# Patient Record
Sex: MALE | Race: WHITE | NOT HISPANIC OR LATINO | Employment: UNEMPLOYED | URBAN - METROPOLITAN AREA
[De-identification: names, ages, dates, MRNs, and addresses within clinical notes are randomized per-mention and may not be internally consistent; named-entity substitution may affect disease eponyms.]

---

## 2019-01-01 ENCOUNTER — HOSPITAL ENCOUNTER (INPATIENT)
Facility: HOSPITAL | Age: 64
LOS: 4 days | End: 2019-06-21
Attending: INTERNAL MEDICINE | Admitting: INTERNAL MEDICINE

## 2019-01-01 ENCOUNTER — APPOINTMENT (OUTPATIENT)
Dept: CARDIOLOGY | Facility: HOSPITAL | Age: 64
End: 2019-01-01

## 2019-01-01 ENCOUNTER — ANESTHESIA EVENT (OUTPATIENT)
Dept: PERIOP | Facility: HOSPITAL | Age: 64
End: 2019-01-01

## 2019-01-01 ENCOUNTER — APPOINTMENT (OUTPATIENT)
Dept: GENERAL RADIOLOGY | Facility: HOSPITAL | Age: 64
End: 2019-01-01

## 2019-01-01 ENCOUNTER — ANESTHESIA (OUTPATIENT)
Dept: PERIOP | Facility: HOSPITAL | Age: 64
End: 2019-01-01

## 2019-01-01 VITALS
OXYGEN SATURATION: 95 % | RESPIRATION RATE: 18 BRPM | TEMPERATURE: 98.1 F | WEIGHT: 174.3 LBS | BODY MASS INDEX: 23.61 KG/M2 | DIASTOLIC BLOOD PRESSURE: 33 MMHG | SYSTOLIC BLOOD PRESSURE: 60 MMHG | HEIGHT: 72 IN

## 2019-01-01 DIAGNOSIS — K56.609 LARGE BOWEL OBSTRUCTION (HCC): Primary | ICD-10-CM

## 2019-01-01 LAB
ALBUMIN SERPL-MCNC: 2.6 G/DL (ref 3.5–5.2)
ALBUMIN SERPL-MCNC: 2.9 G/DL (ref 3.5–5.2)
ALBUMIN SERPL-MCNC: 3 G/DL (ref 3.5–5.2)
ALBUMIN SERPL-MCNC: 3 G/DL (ref 3.5–5.2)
ALBUMIN SERPL-MCNC: 3.3 G/DL (ref 3.5–5.2)
ALBUMIN/GLOB SERPL: 0.8 G/DL
ALBUMIN/GLOB SERPL: 1 G/DL
ALBUMIN/GLOB SERPL: 1.1 G/DL
ALP SERPL-CCNC: 55 U/L (ref 39–117)
ALP SERPL-CCNC: 57 U/L (ref 39–117)
ALP SERPL-CCNC: 58 U/L (ref 39–117)
ALP SERPL-CCNC: 59 U/L (ref 39–117)
ALP SERPL-CCNC: 77 U/L (ref 39–117)
ALT SERPL W P-5'-P-CCNC: 2377 U/L (ref 1–41)
ALT SERPL W P-5'-P-CCNC: 35 U/L (ref 1–41)
ALT SERPL W P-5'-P-CCNC: 38 U/L (ref 1–41)
ALT SERPL W P-5'-P-CCNC: 39 U/L (ref 1–41)
ALT SERPL W P-5'-P-CCNC: 39 U/L (ref 1–41)
ANION GAP SERPL CALCULATED.3IONS-SCNC: 12.2 MMOL/L
ANION GAP SERPL CALCULATED.3IONS-SCNC: 12.5 MMOL/L
ANION GAP SERPL CALCULATED.3IONS-SCNC: 12.8 MMOL/L
ANION GAP SERPL CALCULATED.3IONS-SCNC: 13.3 MMOL/L
ANION GAP SERPL CALCULATED.3IONS-SCNC: 13.4 MMOL/L
ANION GAP SERPL CALCULATED.3IONS-SCNC: 13.5 MMOL/L
ANION GAP SERPL CALCULATED.3IONS-SCNC: 13.8 MMOL/L
ANION GAP SERPL CALCULATED.3IONS-SCNC: 14.3 MMOL/L
ANION GAP SERPL CALCULATED.3IONS-SCNC: 14.5 MMOL/L
ANION GAP SERPL CALCULATED.3IONS-SCNC: 14.5 MMOL/L
ANION GAP SERPL CALCULATED.3IONS-SCNC: 14.6 MMOL/L
ANION GAP SERPL CALCULATED.3IONS-SCNC: 15.1 MMOL/L
ANION GAP SERPL CALCULATED.3IONS-SCNC: 15.9 MMOL/L
ANION GAP SERPL CALCULATED.3IONS-SCNC: 16.6 MMOL/L
ANION GAP SERPL CALCULATED.3IONS-SCNC: 17 MMOL/L
ANION GAP SERPL CALCULATED.3IONS-SCNC: 19 MMOL/L
ANION GAP SERPL CALCULATED.3IONS-SCNC: 24.5 MMOL/L
AORTIC DIMENSIONLESS INDEX: 1.1 (DI)
APTT PPP: 49.5 SECONDS (ref 22.7–35.4)
ARTERIAL PATENCY WRIST A: ABNORMAL
AST SERPL-CCNC: 21 U/L (ref 1–40)
AST SERPL-CCNC: 24 U/L (ref 1–40)
AST SERPL-CCNC: 3642 U/L (ref 1–40)
AST SERPL-CCNC: 38 U/L (ref 1–40)
AST SERPL-CCNC: 44 U/L (ref 1–40)
ATMOSPHERIC PRESS: 752.9 MMHG
BACTERIA SPEC AEROBE CULT: NO GROWTH
BACTERIA UR QL AUTO: ABNORMAL /HPF
BASE EXCESS BLDA CALC-SCNC: -11.2 MMOL/L (ref 0–2)
BASOPHILS # BLD AUTO: 0.01 10*3/MM3 (ref 0–0.2)
BASOPHILS # BLD AUTO: 0.01 10*3/MM3 (ref 0–0.2)
BASOPHILS # BLD AUTO: 0.02 10*3/MM3 (ref 0–0.2)
BASOPHILS NFR BLD AUTO: 0.1 % (ref 0–1.5)
BASOPHILS NFR BLD AUTO: 0.1 % (ref 0–1.5)
BASOPHILS NFR BLD AUTO: 0.2 % (ref 0–1.5)
BDY SITE: ABNORMAL
BH CV ECHO MEAS - ACS: 2.4 CM
BH CV ECHO MEAS - AO MAX PG: 3 MMHG
BH CV ECHO MEAS - AO MEAN PG (FULL): 0 MMHG
BH CV ECHO MEAS - AO MEAN PG: 2 MMHG
BH CV ECHO MEAS - AO ROOT AREA (BSA CORRECTED): 1.7
BH CV ECHO MEAS - AO ROOT AREA: 9.6 CM^2
BH CV ECHO MEAS - AO ROOT DIAM: 3.5 CM
BH CV ECHO MEAS - AO V2 MAX: 84 CM/SEC
BH CV ECHO MEAS - AO V2 MEAN: 67.2 CM/SEC
BH CV ECHO MEAS - AO V2 VTI: 12.9 CM
BH CV ECHO MEAS - ASC AORTA: 3 CM
BH CV ECHO MEAS - AVA(I,A): 4.3 CM^2
BH CV ECHO MEAS - AVA(I,D): 4.3 CM^2
BH CV ECHO MEAS - BSA(HAYCOCK): 2 M^2
BH CV ECHO MEAS - BSA: 2 M^2
BH CV ECHO MEAS - BZI_BMI: 23.8 KILOGRAMS/M^2
BH CV ECHO MEAS - BZI_METRIC_HEIGHT: 182 CM
BH CV ECHO MEAS - BZI_METRIC_WEIGHT: 79 KG
BH CV ECHO MEAS - EDV(CUBED): 46.7 ML
BH CV ECHO MEAS - EDV(MOD-SP2): 113 ML
BH CV ECHO MEAS - EDV(MOD-SP4): 119 ML
BH CV ECHO MEAS - EDV(TEICH): 54.4 ML
BH CV ECHO MEAS - EF(CUBED): 66.5 %
BH CV ECHO MEAS - EF(MOD-BP): 70 %
BH CV ECHO MEAS - EF(MOD-SP2): 68.1 %
BH CV ECHO MEAS - EF(MOD-SP4): 72.3 %
BH CV ECHO MEAS - EF(TEICH): 59 %
BH CV ECHO MEAS - ESV(CUBED): 15.6 ML
BH CV ECHO MEAS - ESV(MOD-SP2): 36 ML
BH CV ECHO MEAS - ESV(MOD-SP4): 33 ML
BH CV ECHO MEAS - ESV(TEICH): 22.3 ML
BH CV ECHO MEAS - FS: 30.6 %
BH CV ECHO MEAS - IVS/LVPW: 1
BH CV ECHO MEAS - IVSD: 1.1 CM
BH CV ECHO MEAS - LAT PEAK E' VEL: 13 CM/SEC
BH CV ECHO MEAS - LV DIASTOLIC VOL/BSA (35-75): 59.4 ML/M^2
BH CV ECHO MEAS - LV MASS(C)D: 124.1 GRAMS
BH CV ECHO MEAS - LV MASS(C)DI: 62 GRAMS/M^2
BH CV ECHO MEAS - LV MEAN PG: 2 MMHG
BH CV ECHO MEAS - LV SYSTOLIC VOL/BSA (12-30): 16.5 ML/M^2
BH CV ECHO MEAS - LV V1 MAX: 81 CM/SEC
BH CV ECHO MEAS - LV V1 MEAN: 67.4 CM/SEC
BH CV ECHO MEAS - LV V1 VTI: 13.5 CM
BH CV ECHO MEAS - LVIDD: 3.6 CM
BH CV ECHO MEAS - LVIDS: 2.5 CM
BH CV ECHO MEAS - LVLD AP2: 8.6 CM
BH CV ECHO MEAS - LVLD AP4: 8.6 CM
BH CV ECHO MEAS - LVLS AP2: 6.9 CM
BH CV ECHO MEAS - LVLS AP4: 6.8 CM
BH CV ECHO MEAS - LVOT AREA (M): 4.2 CM^2
BH CV ECHO MEAS - LVOT AREA: 4.2 CM^2
BH CV ECHO MEAS - LVOT DIAM: 2.3 CM
BH CV ECHO MEAS - LVPWD: 1.1 CM
BH CV ECHO MEAS - MED PEAK E' VEL: 9 CM/SEC
BH CV ECHO MEAS - PA ACC SLOPE: 723 CM/SEC^2
BH CV ECHO MEAS - PA ACC TIME: 0.13 SEC
BH CV ECHO MEAS - PA MAX PG (FULL): 0.51 MMHG
BH CV ECHO MEAS - PA MAX PG: 3.3 MMHG
BH CV ECHO MEAS - PA PR(ACCEL): 21.9 MMHG
BH CV ECHO MEAS - PA V2 MAX: 90.2 CM/SEC
BH CV ECHO MEAS - PVA(V,A): 3.5 CM^2
BH CV ECHO MEAS - PVA(V,D): 3.5 CM^2
BH CV ECHO MEAS - QP/QS: 0.89
BH CV ECHO MEAS - RAP SYSTOLE: 8 MMHG
BH CV ECHO MEAS - RV MAX PG: 2.7 MMHG
BH CV ECHO MEAS - RV MEAN PG: 1 MMHG
BH CV ECHO MEAS - RV V1 MAX: 82.8 CM/SEC
BH CV ECHO MEAS - RV V1 MEAN: 54.9 CM/SEC
BH CV ECHO MEAS - RV V1 VTI: 13.1 CM
BH CV ECHO MEAS - RVOT AREA: 3.8 CM^2
BH CV ECHO MEAS - RVOT DIAM: 2.2 CM
BH CV ECHO MEAS - SI(AO): 62 ML/M^2
BH CV ECHO MEAS - SI(CUBED): 15.5 ML/M^2
BH CV ECHO MEAS - SI(LVOT): 28 ML/M^2
BH CV ECHO MEAS - SI(MOD-SP2): 38.5 ML/M^2
BH CV ECHO MEAS - SI(MOD-SP4): 43 ML/M^2
BH CV ECHO MEAS - SI(TEICH): 16 ML/M^2
BH CV ECHO MEAS - SV(AO): 124.1 ML
BH CV ECHO MEAS - SV(CUBED): 31 ML
BH CV ECHO MEAS - SV(LVOT): 56.1 ML
BH CV ECHO MEAS - SV(MOD-SP2): 77 ML
BH CV ECHO MEAS - SV(MOD-SP4): 86 ML
BH CV ECHO MEAS - SV(RVOT): 49.8 ML
BH CV ECHO MEAS - SV(TEICH): 32.1 ML
BH CV ECHO MEAS - TAPSE (>1.6): 2 CM2
BH CV XLRA - RV BASE: 2.7 CM
BH CV XLRA - TDI S': 19 CM/SEC
BILIRUB SERPL-MCNC: 0.5 MG/DL (ref 0.2–1.2)
BILIRUB SERPL-MCNC: 0.6 MG/DL (ref 0.2–1.2)
BILIRUB SERPL-MCNC: 0.9 MG/DL (ref 0.2–1.2)
BILIRUB UR QL STRIP: NEGATIVE
BUN BLD-MCNC: 25 MG/DL (ref 8–23)
BUN BLD-MCNC: 26 MG/DL (ref 8–23)
BUN BLD-MCNC: 27 MG/DL (ref 8–23)
BUN BLD-MCNC: 27 MG/DL (ref 8–23)
BUN BLD-MCNC: 28 MG/DL (ref 8–23)
BUN BLD-MCNC: 34 MG/DL (ref 8–23)
BUN BLD-MCNC: 40 MG/DL (ref 8–23)
BUN BLD-MCNC: 50 MG/DL (ref 8–23)
BUN BLD-MCNC: 56 MG/DL (ref 8–23)
BUN BLD-MCNC: 68 MG/DL (ref 8–23)
BUN BLD-MCNC: 68 MG/DL (ref 8–23)
BUN BLD-MCNC: 80 MG/DL (ref 8–23)
BUN BLD-MCNC: 84 MG/DL (ref 8–23)
BUN/CREAT SERPL: 13.8 (ref 7–25)
BUN/CREAT SERPL: 14 (ref 7–25)
BUN/CREAT SERPL: 15.3 (ref 7–25)
BUN/CREAT SERPL: 15.3 (ref 7–25)
BUN/CREAT SERPL: 16.5 (ref 7–25)
BUN/CREAT SERPL: 16.6 (ref 7–25)
BUN/CREAT SERPL: 16.9 (ref 7–25)
BUN/CREAT SERPL: 18.1 (ref 7–25)
BUN/CREAT SERPL: 20.4 (ref 7–25)
BUN/CREAT SERPL: 22.2 (ref 7–25)
BUN/CREAT SERPL: 22.4 (ref 7–25)
BUN/CREAT SERPL: 22.5 (ref 7–25)
BUN/CREAT SERPL: 22.5 (ref 7–25)
BUN/CREAT SERPL: 23.1 (ref 7–25)
BUN/CREAT SERPL: 23.6 (ref 7–25)
BUN/CREAT SERPL: 23.8 (ref 7–25)
BUN/CREAT SERPL: 24.1 (ref 7–25)
CALCIUM SPEC-SCNC: 8.4 MG/DL (ref 8.6–10.5)
CALCIUM SPEC-SCNC: 8.5 MG/DL (ref 8.6–10.5)
CALCIUM SPEC-SCNC: 8.7 MG/DL (ref 8.6–10.5)
CALCIUM SPEC-SCNC: 8.8 MG/DL (ref 8.6–10.5)
CALCIUM SPEC-SCNC: 8.9 MG/DL (ref 8.6–10.5)
CALCIUM SPEC-SCNC: 8.9 MG/DL (ref 8.6–10.5)
CALCIUM SPEC-SCNC: 9 MG/DL (ref 8.6–10.5)
CALCIUM SPEC-SCNC: 9 MG/DL (ref 8.6–10.5)
CALCIUM SPEC-SCNC: 9.1 MG/DL (ref 8.6–10.5)
CALCIUM SPEC-SCNC: 9.2 MG/DL (ref 8.6–10.5)
CALCIUM SPEC-SCNC: 9.4 MG/DL (ref 8.6–10.5)
CHLORIDE SERPL-SCNC: 100 MMOL/L (ref 98–107)
CHLORIDE SERPL-SCNC: 100 MMOL/L (ref 98–107)
CHLORIDE SERPL-SCNC: 101 MMOL/L (ref 98–107)
CHLORIDE SERPL-SCNC: 102 MMOL/L (ref 98–107)
CHLORIDE SERPL-SCNC: 102 MMOL/L (ref 98–107)
CHLORIDE SERPL-SCNC: 103 MMOL/L (ref 98–107)
CHLORIDE SERPL-SCNC: 103 MMOL/L (ref 98–107)
CHLORIDE SERPL-SCNC: 104 MMOL/L (ref 98–107)
CHLORIDE SERPL-SCNC: 105 MMOL/L (ref 98–107)
CHLORIDE SERPL-SCNC: 105 MMOL/L (ref 98–107)
CHLORIDE SERPL-SCNC: 96 MMOL/L (ref 98–107)
CHLORIDE SERPL-SCNC: 97 MMOL/L (ref 98–107)
CHLORIDE SERPL-SCNC: 98 MMOL/L (ref 98–107)
CHLORIDE SERPL-SCNC: 98 MMOL/L (ref 98–107)
CLARITY UR: ABNORMAL
CO2 SERPL-SCNC: 18 MMOL/L (ref 22–29)
CO2 SERPL-SCNC: 19 MMOL/L (ref 22–29)
CO2 SERPL-SCNC: 19.4 MMOL/L (ref 22–29)
CO2 SERPL-SCNC: 19.5 MMOL/L (ref 22–29)
CO2 SERPL-SCNC: 19.9 MMOL/L (ref 22–29)
CO2 SERPL-SCNC: 20.2 MMOL/L (ref 22–29)
CO2 SERPL-SCNC: 20.5 MMOL/L (ref 22–29)
CO2 SERPL-SCNC: 20.5 MMOL/L (ref 22–29)
CO2 SERPL-SCNC: 21.1 MMOL/L (ref 22–29)
CO2 SERPL-SCNC: 21.2 MMOL/L (ref 22–29)
CO2 SERPL-SCNC: 21.4 MMOL/L (ref 22–29)
CO2 SERPL-SCNC: 21.6 MMOL/L (ref 22–29)
CO2 SERPL-SCNC: 22.5 MMOL/L (ref 22–29)
CO2 SERPL-SCNC: 22.5 MMOL/L (ref 22–29)
CO2 SERPL-SCNC: 22.7 MMOL/L (ref 22–29)
CO2 SERPL-SCNC: 22.7 MMOL/L (ref 22–29)
CO2 SERPL-SCNC: 23.8 MMOL/L (ref 22–29)
COLOR UR: ABNORMAL
CREAT BLD-MCNC: 1.05 MG/DL (ref 0.76–1.27)
CREAT BLD-MCNC: 1.08 MG/DL (ref 0.76–1.27)
CREAT BLD-MCNC: 1.1 MG/DL (ref 0.76–1.27)
CREAT BLD-MCNC: 1.11 MG/DL (ref 0.76–1.27)
CREAT BLD-MCNC: 1.16 MG/DL (ref 0.76–1.27)
CREAT BLD-MCNC: 1.17 MG/DL (ref 0.76–1.27)
CREAT BLD-MCNC: 1.2 MG/DL (ref 0.76–1.27)
CREAT BLD-MCNC: 1.37 MG/DL (ref 0.76–1.27)
CREAT BLD-MCNC: 1.51 MG/DL (ref 0.76–1.27)
CREAT BLD-MCNC: 1.53 MG/DL (ref 0.76–1.27)
CREAT BLD-MCNC: 2.21 MG/DL (ref 0.76–1.27)
CREAT BLD-MCNC: 2.95 MG/DL (ref 0.76–1.27)
CREAT BLD-MCNC: 3.39 MG/DL (ref 0.76–1.27)
CREAT BLD-MCNC: 4.45 MG/DL (ref 0.76–1.27)
CREAT BLD-MCNC: 4.45 MG/DL (ref 0.76–1.27)
CREAT BLD-MCNC: 5.73 MG/DL (ref 0.76–1.27)
CREAT BLD-MCNC: 6.08 MG/DL (ref 0.76–1.27)
D-LACTATE SERPL-SCNC: 0.8 MMOL/L (ref 0.5–2)
D-LACTATE SERPL-SCNC: 0.8 MMOL/L (ref 0.5–2)
D-LACTATE SERPL-SCNC: 0.9 MMOL/L (ref 0.5–2)
D-LACTATE SERPL-SCNC: 10.6 MMOL/L (ref 0.5–2)
DEPRECATED RDW RBC AUTO: 41.3 FL (ref 37–54)
DEPRECATED RDW RBC AUTO: 43.6 FL (ref 37–54)
DEPRECATED RDW RBC AUTO: 44.8 FL (ref 37–54)
DEPRECATED RDW RBC AUTO: 44.9 FL (ref 37–54)
DEPRECATED RDW RBC AUTO: 45.3 FL (ref 37–54)
DEPRECATED RDW RBC AUTO: 49.9 FL (ref 37–54)
EOSINOPHIL # BLD AUTO: 0 10*3/MM3 (ref 0–0.4)
EOSINOPHIL # BLD MANUAL: 0.04 10*3/MM3 (ref 0–0.4)
EOSINOPHIL NFR BLD AUTO: 0 % (ref 0.3–6.2)
EOSINOPHIL NFR BLD MANUAL: 0.4 % (ref 0.3–6.2)
ERYTHROCYTE [DISTWIDTH] IN BLOOD BY AUTOMATED COUNT: 12.4 % (ref 12.3–15.4)
ERYTHROCYTE [DISTWIDTH] IN BLOOD BY AUTOMATED COUNT: 12.6 % (ref 12.3–15.4)
ERYTHROCYTE [DISTWIDTH] IN BLOOD BY AUTOMATED COUNT: 12.7 % (ref 12.3–15.4)
ERYTHROCYTE [DISTWIDTH] IN BLOOD BY AUTOMATED COUNT: 12.8 % (ref 12.3–15.4)
ERYTHROCYTE [DISTWIDTH] IN BLOOD BY AUTOMATED COUNT: 12.8 % (ref 12.3–15.4)
ERYTHROCYTE [DISTWIDTH] IN BLOOD BY AUTOMATED COUNT: 12.9 % (ref 12.3–15.4)
GFR SERPL CREATININE-BSD FRML MDRD: 10 ML/MIN/1.73
GFR SERPL CREATININE-BSD FRML MDRD: 14 ML/MIN/1.73
GFR SERPL CREATININE-BSD FRML MDRD: 14 ML/MIN/1.73
GFR SERPL CREATININE-BSD FRML MDRD: 19 ML/MIN/1.73
GFR SERPL CREATININE-BSD FRML MDRD: 22 ML/MIN/1.73
GFR SERPL CREATININE-BSD FRML MDRD: 30 ML/MIN/1.73
GFR SERPL CREATININE-BSD FRML MDRD: 46 ML/MIN/1.73
GFR SERPL CREATININE-BSD FRML MDRD: 47 ML/MIN/1.73
GFR SERPL CREATININE-BSD FRML MDRD: 53 ML/MIN/1.73
GFR SERPL CREATININE-BSD FRML MDRD: 61 ML/MIN/1.73
GFR SERPL CREATININE-BSD FRML MDRD: 63 ML/MIN/1.73
GFR SERPL CREATININE-BSD FRML MDRD: 64 ML/MIN/1.73
GFR SERPL CREATININE-BSD FRML MDRD: 67 ML/MIN/1.73
GFR SERPL CREATININE-BSD FRML MDRD: 68 ML/MIN/1.73
GFR SERPL CREATININE-BSD FRML MDRD: 69 ML/MIN/1.73
GFR SERPL CREATININE-BSD FRML MDRD: 72 ML/MIN/1.73
GFR SERPL CREATININE-BSD FRML MDRD: 9 ML/MIN/1.73
GFR SERPL CREATININE-BSD FRML MDRD: ABNORMAL ML/MIN/1.73
GLOBULIN UR ELPH-MCNC: 2.5 GM/DL
GLOBULIN UR ELPH-MCNC: 2.8 GM/DL
GLOBULIN UR ELPH-MCNC: 3 GM/DL
GLOBULIN UR ELPH-MCNC: 3 GM/DL
GLOBULIN UR ELPH-MCNC: 3.6 GM/DL
GLUCOSE BLD-MCNC: 105 MG/DL (ref 65–99)
GLUCOSE BLD-MCNC: 105 MG/DL (ref 65–99)
GLUCOSE BLD-MCNC: 107 MG/DL (ref 65–99)
GLUCOSE BLD-MCNC: 112 MG/DL (ref 65–99)
GLUCOSE BLD-MCNC: 114 MG/DL (ref 65–99)
GLUCOSE BLD-MCNC: 116 MG/DL (ref 65–99)
GLUCOSE BLD-MCNC: 117 MG/DL (ref 65–99)
GLUCOSE BLD-MCNC: 117 MG/DL (ref 65–99)
GLUCOSE BLD-MCNC: 118 MG/DL (ref 65–99)
GLUCOSE BLD-MCNC: 120 MG/DL (ref 65–99)
GLUCOSE BLD-MCNC: 122 MG/DL (ref 65–99)
GLUCOSE BLD-MCNC: 128 MG/DL (ref 65–99)
GLUCOSE BLD-MCNC: 88 MG/DL (ref 65–99)
GLUCOSE BLD-MCNC: 90 MG/DL (ref 65–99)
GLUCOSE BLD-MCNC: 91 MG/DL (ref 65–99)
GLUCOSE BLD-MCNC: 92 MG/DL (ref 65–99)
GLUCOSE BLD-MCNC: 92 MG/DL (ref 65–99)
GLUCOSE BLDC GLUCOMTR-MCNC: 115 MG/DL (ref 70–130)
GLUCOSE BLDC GLUCOMTR-MCNC: 94 MG/DL (ref 70–130)
GLUCOSE UR STRIP-MCNC: NEGATIVE MG/DL
HCO3 BLDA-SCNC: 19.9 MMOL/L (ref 22–28)
HCT VFR BLD AUTO: 37.8 % (ref 37.5–51)
HCT VFR BLD AUTO: 39.6 % (ref 37.5–51)
HCT VFR BLD AUTO: 41 % (ref 37.5–51)
HCT VFR BLD AUTO: 41.3 % (ref 37.5–51)
HCT VFR BLD AUTO: 42.2 % (ref 37.5–51)
HCT VFR BLD AUTO: 43.8 % (ref 37.5–51)
HGB BLD-MCNC: 12.2 G/DL (ref 13–17.7)
HGB BLD-MCNC: 13.1 G/DL (ref 13–17.7)
HGB BLD-MCNC: 13.2 G/DL (ref 13–17.7)
HGB BLD-MCNC: 13.3 G/DL (ref 13–17.7)
HGB BLD-MCNC: 13.5 G/DL (ref 13–17.7)
HGB BLD-MCNC: 14.3 G/DL (ref 13–17.7)
HGB UR QL STRIP.AUTO: ABNORMAL
HOLD SPECIMEN: NORMAL
HOROWITZ INDEX BLD+IHG-RTO: 100 %
HYALINE CASTS UR QL AUTO: ABNORMAL /LPF
IMM GRANULOCYTES # BLD AUTO: 0.05 10*3/MM3 (ref 0–0.05)
IMM GRANULOCYTES # BLD AUTO: 0.06 10*3/MM3 (ref 0–0.05)
IMM GRANULOCYTES # BLD AUTO: 0.07 10*3/MM3 (ref 0–0.05)
IMM GRANULOCYTES NFR BLD AUTO: 0.5 % (ref 0–0.5)
IMM GRANULOCYTES NFR BLD AUTO: 0.5 % (ref 0–0.5)
IMM GRANULOCYTES NFR BLD AUTO: 0.6 % (ref 0–0.5)
INR PPP: 1.24 (ref 0.9–1.1)
INR PPP: 2.79 (ref 0.9–1.1)
KETONES UR QL STRIP: ABNORMAL
LEFT ATRIUM VOLUME INDEX: 24 ML/M2
LEUKOCYTE ESTERASE UR QL STRIP.AUTO: ABNORMAL
LV EF 2D ECHO EST: 70 %
LYMPHOCYTES # BLD AUTO: 0.44 10*3/MM3 (ref 0.7–3.1)
LYMPHOCYTES # BLD AUTO: 0.51 10*3/MM3 (ref 0.7–3.1)
LYMPHOCYTES # BLD AUTO: 0.56 10*3/MM3 (ref 0.7–3.1)
LYMPHOCYTES # BLD MANUAL: 2.39 10*3/MM3 (ref 0.7–3.1)
LYMPHOCYTES NFR BLD AUTO: 3.9 % (ref 19.6–45.3)
LYMPHOCYTES NFR BLD AUTO: 4.7 % (ref 19.6–45.3)
LYMPHOCYTES NFR BLD AUTO: 4.9 % (ref 19.6–45.3)
LYMPHOCYTES NFR BLD MANUAL: 22 % (ref 19.6–45.3)
LYMPHOCYTES NFR BLD MANUAL: 3.3 % (ref 5–12)
MACROCYTES BLD QL SMEAR: ABNORMAL
MAGNESIUM SERPL-MCNC: 2.1 MG/DL (ref 1.6–2.4)
MAGNESIUM SERPL-MCNC: 2.8 MG/DL (ref 1.6–2.4)
MAXIMAL PREDICTED HEART RATE: 158 BPM
MCH RBC QN AUTO: 30.8 PG (ref 26.6–33)
MCH RBC QN AUTO: 30.9 PG (ref 26.6–33)
MCH RBC QN AUTO: 30.9 PG (ref 26.6–33)
MCH RBC QN AUTO: 31 PG (ref 26.6–33)
MCH RBC QN AUTO: 31.1 PG (ref 26.6–33)
MCH RBC QN AUTO: 31.1 PG (ref 26.6–33)
MCHC RBC AUTO-ENTMCNC: 29.5 G/DL (ref 31.5–35.7)
MCHC RBC AUTO-ENTMCNC: 32 G/DL (ref 31.5–35.7)
MCHC RBC AUTO-ENTMCNC: 32.4 G/DL (ref 31.5–35.7)
MCHC RBC AUTO-ENTMCNC: 32.6 G/DL (ref 31.5–35.7)
MCHC RBC AUTO-ENTMCNC: 33.3 G/DL (ref 31.5–35.7)
MCHC RBC AUTO-ENTMCNC: 34.7 G/DL (ref 31.5–35.7)
MCV RBC AUTO: 105.1 FL (ref 79–97)
MCV RBC AUTO: 89.2 FL (ref 79–97)
MCV RBC AUTO: 93.2 FL (ref 79–97)
MCV RBC AUTO: 94.6 FL (ref 79–97)
MCV RBC AUTO: 94.9 FL (ref 79–97)
MCV RBC AUTO: 97.2 FL (ref 79–97)
METAMYELOCYTES NFR BLD MANUAL: 5.7 % (ref 0–0)
MODALITY: ABNORMAL
MONOCYTES # BLD AUTO: 0.36 10*3/MM3 (ref 0.1–0.9)
MONOCYTES # BLD AUTO: 0.95 10*3/MM3 (ref 0.1–0.9)
MONOCYTES # BLD AUTO: 1.35 10*3/MM3 (ref 0.1–0.9)
MONOCYTES # BLD AUTO: 1.43 10*3/MM3 (ref 0.1–0.9)
MONOCYTES NFR BLD AUTO: 11.8 % (ref 5–12)
MONOCYTES NFR BLD AUTO: 13.2 % (ref 5–12)
MONOCYTES NFR BLD AUTO: 8.3 % (ref 5–12)
MYELOCYTES NFR BLD MANUAL: 2.8 % (ref 0–0)
NEUTROPHILS # BLD AUTO: 7.17 10*3/MM3 (ref 1.7–7)
NEUTROPHILS # BLD AUTO: 8.79 10*3/MM3 (ref 1.7–7)
NEUTROPHILS # BLD AUTO: 9.49 10*3/MM3 (ref 1.7–7)
NEUTROPHILS # BLD AUTO: 9.93 10*3/MM3 (ref 1.7–7)
NEUTROPHILS NFR BLD AUTO: 81.4 % (ref 42.7–76)
NEUTROPHILS NFR BLD AUTO: 82.6 % (ref 42.7–76)
NEUTROPHILS NFR BLD AUTO: 87.2 % (ref 42.7–76)
NEUTROPHILS NFR BLD MANUAL: 65.9 % (ref 42.7–76)
NITRITE UR QL STRIP: POSITIVE
NRBC BLD AUTO-RTO: 0 /100 WBC (ref 0–0.2)
O2 A-A PPRESDIFF RESPIRATORY: 0.1 MMHG
PCO2 BLDA: 73 MM HG (ref 35–45)
PEEP RESPIRATORY: 5 CM[H2O]
PH BLDA: 7.04 PH UNITS (ref 7.35–7.45)
PH UR STRIP.AUTO: 6.5 [PH] (ref 5–8)
PHOSPHATE SERPL-MCNC: 2 MG/DL (ref 2.5–4.5)
PHOSPHATE SERPL-MCNC: 2.8 MG/DL (ref 2.5–4.5)
PHOSPHATE SERPL-MCNC: 3.8 MG/DL (ref 2.5–4.5)
PLAT MORPH BLD: NORMAL
PLATELET # BLD AUTO: 151 10*3/MM3 (ref 140–450)
PLATELET # BLD AUTO: 317 10*3/MM3 (ref 140–450)
PLATELET # BLD AUTO: 341 10*3/MM3 (ref 140–450)
PLATELET # BLD AUTO: 344 10*3/MM3 (ref 140–450)
PLATELET # BLD AUTO: 364 10*3/MM3 (ref 140–450)
PLATELET # BLD AUTO: 377 10*3/MM3 (ref 140–450)
PMV BLD AUTO: 10.6 FL (ref 6–12)
PMV BLD AUTO: 10.9 FL (ref 6–12)
PMV BLD AUTO: 10.9 FL (ref 6–12)
PMV BLD AUTO: 11.3 FL (ref 6–12)
PMV BLD AUTO: 11.4 FL (ref 6–12)
PMV BLD AUTO: 11.7 FL (ref 6–12)
PO2 BLDA: 93.8 MM HG (ref 80–100)
POTASSIUM BLD-SCNC: 4 MMOL/L (ref 3.5–5.2)
POTASSIUM BLD-SCNC: 4.1 MMOL/L (ref 3.5–5.2)
POTASSIUM BLD-SCNC: 4.3 MMOL/L (ref 3.5–5.2)
POTASSIUM BLD-SCNC: 4.3 MMOL/L (ref 3.5–5.2)
POTASSIUM BLD-SCNC: 4.4 MMOL/L (ref 3.5–5.2)
POTASSIUM BLD-SCNC: 4.5 MMOL/L (ref 3.5–5.2)
POTASSIUM BLD-SCNC: 4.6 MMOL/L (ref 3.5–5.2)
POTASSIUM BLD-SCNC: 4.6 MMOL/L (ref 3.5–5.2)
POTASSIUM BLD-SCNC: 4.7 MMOL/L (ref 3.5–5.2)
POTASSIUM BLD-SCNC: 4.8 MMOL/L (ref 3.5–5.2)
POTASSIUM BLD-SCNC: 4.9 MMOL/L (ref 3.5–5.2)
PROCALCITONIN SERPL-MCNC: 1.04 NG/ML (ref 0.1–0.25)
PROT SERPL-MCNC: 5.1 G/DL (ref 6–8.5)
PROT SERPL-MCNC: 5.7 G/DL (ref 6–8.5)
PROT SERPL-MCNC: 6 G/DL (ref 6–8.5)
PROT SERPL-MCNC: 6.3 G/DL (ref 6–8.5)
PROT SERPL-MCNC: 6.6 G/DL (ref 6–8.5)
PROT UR QL STRIP: ABNORMAL
PROTHROMBIN TIME: 15.3 SECONDS (ref 11.7–14.2)
PROTHROMBIN TIME: 29.2 SECONDS (ref 11.7–14.2)
RBC # BLD AUTO: 3.93 10*6/MM3 (ref 4.14–5.8)
RBC # BLD AUTO: 4.24 10*6/MM3 (ref 4.14–5.8)
RBC # BLD AUTO: 4.25 10*6/MM3 (ref 4.14–5.8)
RBC # BLD AUTO: 4.32 10*6/MM3 (ref 4.14–5.8)
RBC # BLD AUTO: 4.34 10*6/MM3 (ref 4.14–5.8)
RBC # BLD AUTO: 4.63 10*6/MM3 (ref 4.14–5.8)
RBC # UR: ABNORMAL /HPF
REF LAB TEST METHOD: ABNORMAL
SAO2 % BLDCOA: 92.2 % (ref 92–99)
SET MECH RESP RATE: 24
SODIUM BLD-SCNC: 133 MMOL/L (ref 136–145)
SODIUM BLD-SCNC: 133 MMOL/L (ref 136–145)
SODIUM BLD-SCNC: 134 MMOL/L (ref 136–145)
SODIUM BLD-SCNC: 135 MMOL/L (ref 136–145)
SODIUM BLD-SCNC: 135 MMOL/L (ref 136–145)
SODIUM BLD-SCNC: 136 MMOL/L (ref 136–145)
SODIUM BLD-SCNC: 137 MMOL/L (ref 136–145)
SODIUM BLD-SCNC: 138 MMOL/L (ref 136–145)
SODIUM BLD-SCNC: 139 MMOL/L (ref 136–145)
SODIUM BLD-SCNC: 140 MMOL/L (ref 136–145)
SODIUM BLD-SCNC: 141 MMOL/L (ref 136–145)
SODIUM BLD-SCNC: 144 MMOL/L (ref 136–145)
SP GR UR STRIP: 1.02 (ref 1–1.03)
SQUAMOUS #/AREA URNS HPF: ABNORMAL /HPF
STRESS TARGET HR: 134 BPM
T4 FREE SERPL-MCNC: 1.19 NG/DL (ref 0.93–1.7)
TOTAL RATE: 28 BREATHS/MINUTE
TSH SERPL DL<=0.05 MIU/L-ACNC: 0.57 MIU/ML (ref 0.27–4.2)
URATE SERPL-MCNC: 8.2 MG/DL (ref 3.4–7)
UROBILINOGEN UR QL STRIP: ABNORMAL
VENTILATOR MODE: AC
VT ON VENT VENT: 600 ML
WBC MORPH BLD: NORMAL
WBC NRBC COR # BLD: 10.8 10*3/MM3 (ref 3.4–10.8)
WBC NRBC COR # BLD: 10.88 10*3/MM3 (ref 3.4–10.8)
WBC NRBC COR # BLD: 11.39 10*3/MM3 (ref 3.4–10.8)
WBC NRBC COR # BLD: 11.46 10*3/MM3 (ref 3.4–10.8)
WBC NRBC COR # BLD: 11.48 10*3/MM3 (ref 3.4–10.8)
WBC NRBC COR # BLD: 13.23 10*3/MM3 (ref 3.4–10.8)
WBC UR QL AUTO: ABNORMAL /HPF

## 2019-01-01 PROCEDURE — 25010000002 EPINEPHRINE PF 1 MG/10ML SOLUTION PREFILLED SYRINGE: Performed by: INTERNAL MEDICINE

## 2019-01-01 PROCEDURE — 99231 SBSQ HOSP IP/OBS SF/LOW 25: CPT | Performed by: SURGERY

## 2019-01-01 PROCEDURE — 94799 UNLISTED PULMONARY SVC/PX: CPT

## 2019-01-01 PROCEDURE — 92950 HEART/LUNG RESUSCITATION CPR: CPT

## 2019-01-01 PROCEDURE — 93005 ELECTROCARDIOGRAM TRACING: CPT | Performed by: HOSPITALIST

## 2019-01-01 PROCEDURE — 71045 X-RAY EXAM CHEST 1 VIEW: CPT

## 2019-01-01 PROCEDURE — 84439 ASSAY OF FREE THYROXINE: CPT | Performed by: HOSPITALIST

## 2019-01-01 PROCEDURE — 83605 ASSAY OF LACTIC ACID: CPT | Performed by: INTERNAL MEDICINE

## 2019-01-01 PROCEDURE — 83735 ASSAY OF MAGNESIUM: CPT | Performed by: INTERNAL MEDICINE

## 2019-01-01 PROCEDURE — 25010000002 PIPERACILLIN SOD-TAZOBACTAM PER 1 G: Performed by: INTERNAL MEDICINE

## 2019-01-01 PROCEDURE — 74018 RADEX ABDOMEN 1 VIEW: CPT

## 2019-01-01 PROCEDURE — 99232 SBSQ HOSP IP/OBS MODERATE 35: CPT | Performed by: SURGERY

## 2019-01-01 PROCEDURE — 80053 COMPREHEN METABOLIC PANEL: CPT | Performed by: INTERNAL MEDICINE

## 2019-01-01 PROCEDURE — 25010000003 EPINEPHRINE 30 MG/30ML SOLUTION 30 ML VIAL: Performed by: INTERNAL MEDICINE

## 2019-01-01 PROCEDURE — 82803 BLOOD GASES ANY COMBINATION: CPT

## 2019-01-01 PROCEDURE — 5A1935Z RESPIRATORY VENTILATION, LESS THAN 24 CONSECUTIVE HOURS: ICD-10-PCS | Performed by: INTERNAL MEDICINE

## 2019-01-01 PROCEDURE — 84100 ASSAY OF PHOSPHORUS: CPT | Performed by: HOSPITALIST

## 2019-01-01 PROCEDURE — 25010000002 FENTANYL CITRATE (PF) 100 MCG/2ML SOLUTION: Performed by: NURSE ANESTHETIST, CERTIFIED REGISTERED

## 2019-01-01 PROCEDURE — 93306 TTE W/DOPPLER COMPLETE: CPT

## 2019-01-01 PROCEDURE — 25010000002 ONDANSETRON PER 1 MG: Performed by: INTERNAL MEDICINE

## 2019-01-01 PROCEDURE — 81001 URINALYSIS AUTO W/SCOPE: CPT | Performed by: UROLOGY

## 2019-01-01 PROCEDURE — 84550 ASSAY OF BLOOD/URIC ACID: CPT | Performed by: INTERNAL MEDICINE

## 2019-01-01 PROCEDURE — 0YU60JZ SUPPLEMENT LEFT INGUINAL REGION WITH SYNTHETIC SUBSTITUTE, OPEN APPROACH: ICD-10-PCS | Performed by: SURGERY

## 2019-01-01 PROCEDURE — 82962 GLUCOSE BLOOD TEST: CPT

## 2019-01-01 PROCEDURE — 25010000002 PROPOFOL 10 MG/ML EMULSION: Performed by: NURSE ANESTHETIST, CERTIFIED REGISTERED

## 2019-01-01 PROCEDURE — 25010000002 PHENYLEPHRINE 10 MG/ML SOLUTION 5 ML VIAL: Performed by: INTERNAL MEDICINE

## 2019-01-01 PROCEDURE — 94660 CPAP INITIATION&MGMT: CPT

## 2019-01-01 PROCEDURE — 85007 BL SMEAR W/DIFF WBC COUNT: CPT | Performed by: INTERNAL MEDICINE

## 2019-01-01 PROCEDURE — 99253 IP/OBS CNSLTJ NEW/EST LOW 45: CPT | Performed by: SURGERY

## 2019-01-01 PROCEDURE — 25010000002 MORPHINE PER 10 MG: Performed by: INTERNAL MEDICINE

## 2019-01-01 PROCEDURE — 85025 COMPLETE CBC W/AUTO DIFF WBC: CPT | Performed by: INTERNAL MEDICINE

## 2019-01-01 PROCEDURE — 93306 TTE W/DOPPLER COMPLETE: CPT | Performed by: INTERNAL MEDICINE

## 2019-01-01 PROCEDURE — 83735 ASSAY OF MAGNESIUM: CPT | Performed by: HOSPITALIST

## 2019-01-01 PROCEDURE — 80048 BASIC METABOLIC PNL TOTAL CA: CPT | Performed by: INTERNAL MEDICINE

## 2019-01-01 PROCEDURE — 94002 VENT MGMT INPAT INIT DAY: CPT

## 2019-01-01 PROCEDURE — 49507 PRP I/HERN INIT BLOCK >5 YR: CPT | Performed by: PHYSICIAN ASSISTANT

## 2019-01-01 PROCEDURE — C1781 MESH (IMPLANTABLE): HCPCS | Performed by: SURGERY

## 2019-01-01 PROCEDURE — 87086 URINE CULTURE/COLONY COUNT: CPT | Performed by: UROLOGY

## 2019-01-01 PROCEDURE — 25010000002 DEXAMETHASONE PER 1 MG: Performed by: NURSE ANESTHETIST, CERTIFIED REGISTERED

## 2019-01-01 PROCEDURE — 93010 ELECTROCARDIOGRAM REPORT: CPT | Performed by: INTERNAL MEDICINE

## 2019-01-01 PROCEDURE — 25010000002 PIPERACILLIN SOD-TAZOBACTAM PER 1 G: Performed by: HOSPITALIST

## 2019-01-01 PROCEDURE — 85610 PROTHROMBIN TIME: CPT | Performed by: INTERNAL MEDICINE

## 2019-01-01 PROCEDURE — 25010000002 PROPOFOL 10 MG/ML EMULSION: Performed by: INTERNAL MEDICINE

## 2019-01-01 PROCEDURE — 25010000002 FENTANYL CITRATE (PF) 100 MCG/2ML SOLUTION: Performed by: ANESTHESIOLOGY

## 2019-01-01 PROCEDURE — 84100 ASSAY OF PHOSPHORUS: CPT | Performed by: INTERNAL MEDICINE

## 2019-01-01 PROCEDURE — 84145 PROCALCITONIN (PCT): CPT | Performed by: INTERNAL MEDICINE

## 2019-01-01 PROCEDURE — 25010000002 SUCCINYLCHOLINE PER 20 MG: Performed by: NURSE ANESTHETIST, CERTIFIED REGISTERED

## 2019-01-01 PROCEDURE — 85025 COMPLETE CBC W/AUTO DIFF WBC: CPT | Performed by: HOSPITALIST

## 2019-01-01 PROCEDURE — 25010000002 ONDANSETRON PER 1 MG: Performed by: NURSE ANESTHETIST, CERTIFIED REGISTERED

## 2019-01-01 PROCEDURE — 25010000002 NEOSTIGMINE PER 0.5 MG: Performed by: ANESTHESIOLOGY

## 2019-01-01 PROCEDURE — 85027 COMPLETE CBC AUTOMATED: CPT | Performed by: INTERNAL MEDICINE

## 2019-01-01 PROCEDURE — 84443 ASSAY THYROID STIM HORMONE: CPT | Performed by: HOSPITALIST

## 2019-01-01 PROCEDURE — 25010000002 MIDAZOLAM PER 1 MG: Performed by: ANESTHESIOLOGY

## 2019-01-01 PROCEDURE — 25010000002 ATROPINE PER 0.01 MG: Performed by: INTERNAL MEDICINE

## 2019-01-01 PROCEDURE — 0BH17EZ INSERTION OF ENDOTRACHEAL AIRWAY INTO TRACHEA, VIA NATURAL OR ARTIFICIAL OPENING: ICD-10-PCS | Performed by: INTERNAL MEDICINE

## 2019-01-01 PROCEDURE — 99024 POSTOP FOLLOW-UP VISIT: CPT | Performed by: SURGERY

## 2019-01-01 PROCEDURE — 83605 ASSAY OF LACTIC ACID: CPT | Performed by: HOSPITALIST

## 2019-01-01 PROCEDURE — 36600 WITHDRAWAL OF ARTERIAL BLOOD: CPT

## 2019-01-01 PROCEDURE — 25010000002 MAGNESIUM SULFATE IN D5W 1G/100ML (PREMIX) 1-5 GM/100ML-% SOLUTION: Performed by: INTERNAL MEDICINE

## 2019-01-01 PROCEDURE — 85730 THROMBOPLASTIN TIME PARTIAL: CPT | Performed by: INTERNAL MEDICINE

## 2019-01-01 PROCEDURE — 49507 PRP I/HERN INIT BLOCK >5 YR: CPT | Performed by: SURGERY

## 2019-01-01 DEVICE — BARD MESH LARGE PRE-SHAPED WITH KEYHOLE
Type: IMPLANTABLE DEVICE | Site: INGUINAL | Status: FUNCTIONAL
Brand: BARD MESH PRE-SHAPED

## 2019-01-01 RX ORDER — MAGNESIUM SULFATE 1 G/100ML
INJECTION INTRAVENOUS
Status: COMPLETED | OUTPATIENT
Start: 2019-01-01 | End: 2019-01-01

## 2019-01-01 RX ORDER — LIDOCAINE HYDROCHLORIDE 10 MG/ML
0.5 INJECTION, SOLUTION EPIDURAL; INFILTRATION; INTRACAUDAL; PERINEURAL ONCE AS NEEDED
Status: DISCONTINUED | OUTPATIENT
Start: 2019-01-01 | End: 2019-01-01 | Stop reason: HOSPADM

## 2019-01-01 RX ORDER — DILTIAZEM HCL IN NACL,ISO-OSM 125 MG/125
5-15 PLASTIC BAG, INJECTION (ML) INTRAVENOUS
Status: DISCONTINUED | OUTPATIENT
Start: 2019-01-01 | End: 2019-01-01 | Stop reason: HOSPADM

## 2019-01-01 RX ORDER — HYDRALAZINE HYDROCHLORIDE 20 MG/ML
5 INJECTION INTRAMUSCULAR; INTRAVENOUS
Status: DISCONTINUED | OUTPATIENT
Start: 2019-01-01 | End: 2019-01-01 | Stop reason: HOSPADM

## 2019-01-01 RX ORDER — NALOXONE HCL 0.4 MG/ML
0.4 VIAL (ML) INJECTION
Status: DISCONTINUED | OUTPATIENT
Start: 2019-01-01 | End: 2019-01-01 | Stop reason: HOSPADM

## 2019-01-01 RX ORDER — NALOXONE HCL 0.4 MG/ML
0.2 VIAL (ML) INJECTION AS NEEDED
Status: DISCONTINUED | OUTPATIENT
Start: 2019-01-01 | End: 2019-01-01 | Stop reason: HOSPADM

## 2019-01-01 RX ORDER — ATROPINE SULFATE 1 MG/ML
INJECTION, SOLUTION INTRAMUSCULAR; INTRAVENOUS; SUBCUTANEOUS
Status: COMPLETED | OUTPATIENT
Start: 2019-01-01 | End: 2019-01-01

## 2019-01-01 RX ORDER — ONDANSETRON 2 MG/ML
4 INJECTION INTRAMUSCULAR; INTRAVENOUS EVERY 6 HOURS PRN
Status: DISCONTINUED | OUTPATIENT
Start: 2019-01-01 | End: 2019-01-01 | Stop reason: HOSPADM

## 2019-01-01 RX ORDER — GLYCOPYRROLATE 0.2 MG/ML
INJECTION INTRAMUSCULAR; INTRAVENOUS AS NEEDED
Status: DISCONTINUED | OUTPATIENT
Start: 2019-01-01 | End: 2019-01-01 | Stop reason: SURG

## 2019-01-01 RX ORDER — HEPARIN SODIUM 5000 [USP'U]/ML
80 INJECTION, SOLUTION INTRAVENOUS; SUBCUTANEOUS ONCE
Status: DISCONTINUED | OUTPATIENT
Start: 2019-01-01 | End: 2019-01-01 | Stop reason: HOSPADM

## 2019-01-01 RX ORDER — ACETAMINOPHEN 325 MG/1
650 TABLET ORAL EVERY 4 HOURS PRN
Status: DISCONTINUED | OUTPATIENT
Start: 2019-01-01 | End: 2019-01-01 | Stop reason: HOSPADM

## 2019-01-01 RX ORDER — FAMOTIDINE 10 MG/ML
20 INJECTION, SOLUTION INTRAVENOUS ONCE
Status: COMPLETED | OUTPATIENT
Start: 2019-01-01 | End: 2019-01-01

## 2019-01-01 RX ORDER — PROPOFOL 10 MG/ML
VIAL (ML) INTRAVENOUS AS NEEDED
Status: DISCONTINUED | OUTPATIENT
Start: 2019-01-01 | End: 2019-01-01 | Stop reason: SURG

## 2019-01-01 RX ORDER — BUPIVACAINE HYDROCHLORIDE 2.5 MG/ML
INJECTION, SOLUTION EPIDURAL; INFILTRATION; INTRACAUDAL AS NEEDED
Status: DISCONTINUED | OUTPATIENT
Start: 2019-01-01 | End: 2019-01-01 | Stop reason: HOSPADM

## 2019-01-01 RX ORDER — WOUND DRESSING ADHESIVE - LIQUID
LIQUID MISCELLANEOUS AS NEEDED
Status: DISCONTINUED | OUTPATIENT
Start: 2019-01-01 | End: 2019-01-01 | Stop reason: HOSPADM

## 2019-01-01 RX ORDER — SODIUM CHLORIDE 0.9 % (FLUSH) 0.9 %
1-10 SYRINGE (ML) INJECTION AS NEEDED
Status: DISCONTINUED | OUTPATIENT
Start: 2019-01-01 | End: 2019-01-01 | Stop reason: HOSPADM

## 2019-01-01 RX ORDER — FLUMAZENIL 0.1 MG/ML
0.2 INJECTION INTRAVENOUS AS NEEDED
Status: DISCONTINUED | OUTPATIENT
Start: 2019-01-01 | End: 2019-01-01 | Stop reason: HOSPADM

## 2019-01-01 RX ORDER — NITROGLYCERIN 0.4 MG/1
0.4 TABLET SUBLINGUAL
Status: DISCONTINUED | OUTPATIENT
Start: 2019-01-01 | End: 2019-01-01 | Stop reason: HOSPADM

## 2019-01-01 RX ORDER — MIDAZOLAM HYDROCHLORIDE 1 MG/ML
1 INJECTION INTRAMUSCULAR; INTRAVENOUS
Status: DISCONTINUED | OUTPATIENT
Start: 2019-01-01 | End: 2019-01-01 | Stop reason: HOSPADM

## 2019-01-01 RX ORDER — PROMETHAZINE HYDROCHLORIDE 25 MG/ML
6.25 INJECTION, SOLUTION INTRAMUSCULAR; INTRAVENOUS
Status: DISCONTINUED | OUTPATIENT
Start: 2019-01-01 | End: 2019-01-01 | Stop reason: HOSPADM

## 2019-01-01 RX ORDER — FENTANYL CITRATE 50 UG/ML
50 INJECTION, SOLUTION INTRAMUSCULAR; INTRAVENOUS
Status: DISCONTINUED | OUTPATIENT
Start: 2019-01-01 | End: 2019-01-01 | Stop reason: HOSPADM

## 2019-01-01 RX ORDER — PROMETHAZINE HYDROCHLORIDE 25 MG/ML
12.5 INJECTION, SOLUTION INTRAMUSCULAR; INTRAVENOUS ONCE AS NEEDED
Status: DISCONTINUED | OUTPATIENT
Start: 2019-01-01 | End: 2019-01-01 | Stop reason: HOSPADM

## 2019-01-01 RX ORDER — PROMETHAZINE HYDROCHLORIDE 25 MG/1
25 SUPPOSITORY RECTAL ONCE AS NEEDED
Status: DISCONTINUED | OUTPATIENT
Start: 2019-01-01 | End: 2019-01-01 | Stop reason: HOSPADM

## 2019-01-01 RX ORDER — ALPRAZOLAM 0.5 MG/1
0.5 TABLET ORAL ONCE
Status: COMPLETED | OUTPATIENT
Start: 2019-01-01 | End: 2019-01-01

## 2019-01-01 RX ORDER — ONDANSETRON 2 MG/ML
INJECTION INTRAMUSCULAR; INTRAVENOUS AS NEEDED
Status: DISCONTINUED | OUTPATIENT
Start: 2019-01-01 | End: 2019-01-01 | Stop reason: SURG

## 2019-01-01 RX ORDER — PROMETHAZINE HYDROCHLORIDE 25 MG/1
25 TABLET ORAL ONCE AS NEEDED
Status: DISCONTINUED | OUTPATIENT
Start: 2019-01-01 | End: 2019-01-01 | Stop reason: HOSPADM

## 2019-01-01 RX ORDER — SUCCINYLCHOLINE CHLORIDE 20 MG/ML
INJECTION INTRAMUSCULAR; INTRAVENOUS AS NEEDED
Status: DISCONTINUED | OUTPATIENT
Start: 2019-01-01 | End: 2019-01-01 | Stop reason: SURG

## 2019-01-01 RX ORDER — HEPARIN SODIUM 10000 [USP'U]/100ML
18 INJECTION, SOLUTION INTRAVENOUS
Status: DISCONTINUED | OUTPATIENT
Start: 2019-01-01 | End: 2019-01-01 | Stop reason: HOSPADM

## 2019-01-01 RX ORDER — CALCIUM CHLORIDE 100 MG/ML
INJECTION INTRAVENOUS; INTRAVENTRICULAR
Status: COMPLETED | OUTPATIENT
Start: 2019-01-01 | End: 2019-01-01

## 2019-01-01 RX ORDER — MIDAZOLAM HYDROCHLORIDE 1 MG/ML
2 INJECTION INTRAMUSCULAR; INTRAVENOUS
Status: DISCONTINUED | OUTPATIENT
Start: 2019-01-01 | End: 2019-01-01 | Stop reason: HOSPADM

## 2019-01-01 RX ORDER — DEXAMETHASONE SODIUM PHOSPHATE 10 MG/ML
INJECTION INTRAMUSCULAR; INTRAVENOUS AS NEEDED
Status: DISCONTINUED | OUTPATIENT
Start: 2019-01-01 | End: 2019-01-01 | Stop reason: SURG

## 2019-01-01 RX ORDER — HYDROMORPHONE HYDROCHLORIDE 1 MG/ML
0.5 INJECTION, SOLUTION INTRAMUSCULAR; INTRAVENOUS; SUBCUTANEOUS
Status: DISCONTINUED | OUTPATIENT
Start: 2019-01-01 | End: 2019-01-01 | Stop reason: HOSPADM

## 2019-01-01 RX ORDER — SODIUM CHLORIDE 0.9 % (FLUSH) 0.9 %
3-10 SYRINGE (ML) INJECTION AS NEEDED
Status: DISCONTINUED | OUTPATIENT
Start: 2019-01-01 | End: 2019-01-01 | Stop reason: HOSPADM

## 2019-01-01 RX ORDER — PANTOPRAZOLE SODIUM 40 MG/10ML
40 INJECTION, POWDER, LYOPHILIZED, FOR SOLUTION INTRAVENOUS
Status: DISCONTINUED | OUTPATIENT
Start: 2019-01-01 | End: 2019-01-01

## 2019-01-01 RX ORDER — MAGNESIUM HYDROXIDE 1200 MG/15ML
3000 LIQUID ORAL CONTINUOUS
Status: DISCONTINUED | OUTPATIENT
Start: 2019-01-01 | End: 2019-01-01 | Stop reason: HOSPADM

## 2019-01-01 RX ORDER — ONDANSETRON 2 MG/ML
4 INJECTION INTRAMUSCULAR; INTRAVENOUS ONCE AS NEEDED
Status: DISCONTINUED | OUTPATIENT
Start: 2019-01-01 | End: 2019-01-01 | Stop reason: HOSPADM

## 2019-01-01 RX ORDER — LIDOCAINE HYDROCHLORIDE 20 MG/ML
INJECTION, SOLUTION INFILTRATION; PERINEURAL AS NEEDED
Status: DISCONTINUED | OUTPATIENT
Start: 2019-01-01 | End: 2019-01-01 | Stop reason: SURG

## 2019-01-01 RX ORDER — SODIUM CHLORIDE 0.9 % (FLUSH) 0.9 %
3 SYRINGE (ML) INJECTION EVERY 12 HOURS SCHEDULED
Status: DISCONTINUED | OUTPATIENT
Start: 2019-01-01 | End: 2019-01-01 | Stop reason: HOSPADM

## 2019-01-01 RX ORDER — FENTANYL CITRATE 50 UG/ML
INJECTION, SOLUTION INTRAMUSCULAR; INTRAVENOUS AS NEEDED
Status: DISCONTINUED | OUTPATIENT
Start: 2019-01-01 | End: 2019-01-01 | Stop reason: SURG

## 2019-01-01 RX ORDER — SODIUM CHLORIDE, SODIUM LACTATE, POTASSIUM CHLORIDE, CALCIUM CHLORIDE 600; 310; 30; 20 MG/100ML; MG/100ML; MG/100ML; MG/100ML
9 INJECTION, SOLUTION INTRAVENOUS CONTINUOUS
Status: DISCONTINUED | OUTPATIENT
Start: 2019-01-01 | End: 2019-01-01

## 2019-01-01 RX ORDER — ALBUTEROL SULFATE 2.5 MG/3ML
2.5 SOLUTION RESPIRATORY (INHALATION) ONCE AS NEEDED
Status: DISCONTINUED | OUTPATIENT
Start: 2019-01-01 | End: 2019-01-01 | Stop reason: HOSPADM

## 2019-01-01 RX ORDER — PANTOPRAZOLE SODIUM 40 MG/10ML
40 INJECTION, POWDER, LYOPHILIZED, FOR SOLUTION INTRAVENOUS EVERY 12 HOURS SCHEDULED
Status: DISCONTINUED | OUTPATIENT
Start: 2019-01-01 | End: 2019-01-01 | Stop reason: HOSPADM

## 2019-01-01 RX ORDER — SODIUM CHLORIDE 9 MG/ML
125 INJECTION, SOLUTION INTRAVENOUS CONTINUOUS
Status: DISCONTINUED | OUTPATIENT
Start: 2019-01-01 | End: 2019-01-01 | Stop reason: HOSPADM

## 2019-01-01 RX ORDER — LABETALOL HYDROCHLORIDE 5 MG/ML
5 INJECTION, SOLUTION INTRAVENOUS
Status: DISCONTINUED | OUTPATIENT
Start: 2019-01-01 | End: 2019-01-01 | Stop reason: HOSPADM

## 2019-01-01 RX ORDER — MORPHINE SULFATE 2 MG/ML
1 INJECTION, SOLUTION INTRAMUSCULAR; INTRAVENOUS EVERY 4 HOURS PRN
Status: DISCONTINUED | OUTPATIENT
Start: 2019-01-01 | End: 2019-01-01 | Stop reason: HOSPADM

## 2019-01-01 RX ORDER — PROPOFOL 10 MG/ML
VIAL (ML) INTRAVENOUS
Status: COMPLETED | OUTPATIENT
Start: 2019-01-01 | End: 2019-01-01

## 2019-01-01 RX ORDER — ROCURONIUM BROMIDE 10 MG/ML
INJECTION, SOLUTION INTRAVENOUS AS NEEDED
Status: DISCONTINUED | OUTPATIENT
Start: 2019-01-01 | End: 2019-01-01 | Stop reason: SURG

## 2019-01-01 RX ADMIN — DEXAMETHASONE SODIUM PHOSPHATE 4 MG: 10 INJECTION INTRAMUSCULAR; INTRAVENOUS at 13:50

## 2019-01-01 RX ADMIN — EPINEPHRINE 1 MG: 0.1 INJECTION, SOLUTION ENDOTRACHEAL; INTRACARDIAC; INTRAVENOUS at 06:48

## 2019-01-01 RX ADMIN — FENTANYL CITRATE 50 MCG: 50 INJECTION INTRAMUSCULAR; INTRAVENOUS at 15:10

## 2019-01-01 RX ADMIN — MIDAZOLAM 1 MG: 1 INJECTION INTRAMUSCULAR; INTRAVENOUS at 12:31

## 2019-01-01 RX ADMIN — LIDOCAINE HYDROCHLORIDE 60 MG: 20 INJECTION, SOLUTION INFILTRATION; PERINEURAL at 13:30

## 2019-01-01 RX ADMIN — SUCCINYLCHOLINE CHLORIDE 140 MG: 20 INJECTION, SOLUTION INTRAMUSCULAR; INTRAVENOUS; PARENTERAL at 13:30

## 2019-01-01 RX ADMIN — MORPHINE SULFATE 1 MG: 2 INJECTION, SOLUTION INTRAMUSCULAR; INTRAVENOUS at 01:58

## 2019-01-01 RX ADMIN — ALPRAZOLAM 0.5 MG: 0.5 TABLET ORAL at 20:29

## 2019-01-01 RX ADMIN — ATROPINE SULFATE 1 MG: 1 INJECTION, SOLUTION INTRAMUSCULAR; INTRAVENOUS; SUBCUTANEOUS at 06:37

## 2019-01-01 RX ADMIN — SODIUM CHLORIDE, PRESERVATIVE FREE 3 ML: 5 INJECTION INTRAVENOUS at 10:36

## 2019-01-01 RX ADMIN — ATROPINE SULFATE 1 MG: 1 INJECTION, SOLUTION INTRAMUSCULAR; INTRAVENOUS; SUBCUTANEOUS at 06:53

## 2019-01-01 RX ADMIN — TAZOBACTAM SODIUM AND PIPERACILLIN SODIUM 3.38 G: 375; 3 INJECTION, SOLUTION INTRAVENOUS at 14:42

## 2019-01-01 RX ADMIN — SODIUM CHLORIDE, PRESERVATIVE FREE 3 ML: 5 INJECTION INTRAVENOUS at 19:56

## 2019-01-01 RX ADMIN — SODIUM CHLORIDE, POTASSIUM CHLORIDE, SODIUM LACTATE AND CALCIUM CHLORIDE 9 ML/HR: 600; 310; 30; 20 INJECTION, SOLUTION INTRAVENOUS at 12:30

## 2019-01-01 RX ADMIN — SODIUM CHLORIDE 3000 ML: 900 IRRIGANT IRRIGATION at 10:00

## 2019-01-01 RX ADMIN — ONDANSETRON HYDROCHLORIDE 4 MG: 2 SOLUTION INTRAMUSCULAR; INTRAVENOUS at 19:55

## 2019-01-01 RX ADMIN — PANTOPRAZOLE SODIUM 40 MG: 40 INJECTION, POWDER, FOR SOLUTION INTRAVENOUS at 10:35

## 2019-01-01 RX ADMIN — FENTANYL CITRATE 50 MCG: 50 INJECTION INTRAMUSCULAR; INTRAVENOUS at 13:42

## 2019-01-01 RX ADMIN — SODIUM CHLORIDE 3000 ML: 900 IRRIGANT IRRIGATION at 01:30

## 2019-01-01 RX ADMIN — TAZOBACTAM SODIUM AND PIPERACILLIN SODIUM 3.38 G: 375; 3 INJECTION, SOLUTION INTRAVENOUS at 16:35

## 2019-01-01 RX ADMIN — FENTANYL CITRATE 50 MCG: 50 INJECTION INTRAMUSCULAR; INTRAVENOUS at 13:26

## 2019-01-01 RX ADMIN — EPINEPHRINE 1 MG: 0.1 INJECTION, SOLUTION ENDOTRACHEAL; INTRACARDIAC; INTRAVENOUS at 07:02

## 2019-01-01 RX ADMIN — DILTIAZEM HYDROCHLORIDE 5 MG/HR: 5 INJECTION INTRAVENOUS at 16:28

## 2019-01-01 RX ADMIN — EPINEPHRINE 1 MG: 0.1 INJECTION, SOLUTION ENDOTRACHEAL; INTRACARDIAC; INTRAVENOUS at 06:58

## 2019-01-01 RX ADMIN — MAGNESIUM SULFATE HEPTAHYDRATE 1 G: 1 INJECTION, SOLUTION INTRAVENOUS at 06:58

## 2019-01-01 RX ADMIN — EPINEPHRINE 1 MG: 0.1 INJECTION, SOLUTION ENDOTRACHEAL; INTRACARDIAC; INTRAVENOUS at 06:35

## 2019-01-01 RX ADMIN — EPINEPHRINE 1 MG: 0.1 INJECTION, SOLUTION ENDOTRACHEAL; INTRACARDIAC; INTRAVENOUS at 06:26

## 2019-01-01 RX ADMIN — PANTOPRAZOLE SODIUM 40 MG: 40 INJECTION, POWDER, FOR SOLUTION INTRAVENOUS at 21:10

## 2019-01-01 RX ADMIN — GLYCOPYRROLATE 0.4 MG: 0.2 INJECTION INTRAMUSCULAR; INTRAVENOUS at 15:03

## 2019-01-01 RX ADMIN — PHENYLEPHRINE HYDROCHLORIDE 2 MCG/KG/MIN: 10 INJECTION INTRAVENOUS at 06:51

## 2019-01-01 RX ADMIN — EPINEPHRINE 1 MG: 0.1 INJECTION, SOLUTION ENDOTRACHEAL; INTRACARDIAC; INTRAVENOUS at 07:06

## 2019-01-01 RX ADMIN — SODIUM CHLORIDE, PRESERVATIVE FREE 3 ML: 5 INJECTION INTRAVENOUS at 23:08

## 2019-01-01 RX ADMIN — EPINEPHRINE 1 MG: 0.1 INJECTION, SOLUTION ENDOTRACHEAL; INTRACARDIAC; INTRAVENOUS at 06:41

## 2019-01-01 RX ADMIN — ONDANSETRON HYDROCHLORIDE 4 MG: 2 SOLUTION INTRAMUSCULAR; INTRAVENOUS at 01:19

## 2019-01-01 RX ADMIN — PANTOPRAZOLE SODIUM 40 MG: 40 INJECTION, POWDER, FOR SOLUTION INTRAVENOUS at 08:46

## 2019-01-01 RX ADMIN — PROPOFOL 140 MG: 10 INJECTION, EMULSION INTRAVENOUS at 13:30

## 2019-01-01 RX ADMIN — TAZOBACTAM SODIUM AND PIPERACILLIN SODIUM 3.38 G: 375; 3 INJECTION, SOLUTION INTRAVENOUS at 05:55

## 2019-01-01 RX ADMIN — TAZOBACTAM SODIUM AND PIPERACILLIN SODIUM 3.38 G: 375; 3 INJECTION, SOLUTION INTRAVENOUS at 22:36

## 2019-01-01 RX ADMIN — SODIUM CHLORIDE 75 ML/HR: 9 INJECTION, SOLUTION INTRAVENOUS at 14:19

## 2019-01-01 RX ADMIN — FENTANYL CITRATE 50 MCG: 50 INJECTION INTRAMUSCULAR; INTRAVENOUS at 12:30

## 2019-01-01 RX ADMIN — EPINEPHRINE 1 MG: 0.1 INJECTION, SOLUTION ENDOTRACHEAL; INTRACARDIAC; INTRAVENOUS at 06:29

## 2019-01-01 RX ADMIN — SODIUM CHLORIDE, PRESERVATIVE FREE 3 ML: 5 INJECTION INTRAVENOUS at 21:10

## 2019-01-01 RX ADMIN — EPINEPHRINE 1 MG: 0.1 INJECTION, SOLUTION ENDOTRACHEAL; INTRACARDIAC; INTRAVENOUS at 06:55

## 2019-01-01 RX ADMIN — SODIUM CHLORIDE 75 ML/HR: 9 INJECTION, SOLUTION INTRAVENOUS at 23:48

## 2019-01-01 RX ADMIN — ROCURONIUM BROMIDE 10 MG: 10 INJECTION INTRAVENOUS at 13:30

## 2019-01-01 RX ADMIN — TAZOBACTAM SODIUM AND PIPERACILLIN SODIUM 3.38 G: 375; 3 INJECTION, SOLUTION INTRAVENOUS at 22:59

## 2019-01-01 RX ADMIN — SODIUM CHLORIDE 125 ML/HR: 9 INJECTION, SOLUTION INTRAVENOUS at 06:28

## 2019-01-01 RX ADMIN — PROPOFOL 5 MG: 10 INJECTION, EMULSION INTRAVENOUS at 06:31

## 2019-01-01 RX ADMIN — SODIUM CHLORIDE 125 ML/HR: 9 INJECTION, SOLUTION INTRAVENOUS at 23:42

## 2019-01-01 RX ADMIN — SODIUM CHLORIDE, PRESERVATIVE FREE 10 ML: 5 INJECTION INTRAVENOUS at 08:46

## 2019-01-01 RX ADMIN — DILTIAZEM HYDROCHLORIDE 5 MG/HR: 5 INJECTION INTRAVENOUS at 12:31

## 2019-01-01 RX ADMIN — SODIUM CHLORIDE 125 ML/HR: 9 INJECTION, SOLUTION INTRAVENOUS at 06:27

## 2019-01-01 RX ADMIN — MORPHINE SULFATE 1 MG: 2 INJECTION, SOLUTION INTRAMUSCULAR; INTRAVENOUS at 01:26

## 2019-01-01 RX ADMIN — SODIUM PHOSPHATE, MONOBASIC, MONOHYDRATE 10 MMOL: 276; 142 INJECTION, SOLUTION INTRAVENOUS at 19:09

## 2019-01-01 RX ADMIN — EPINEPHRINE 0.3 MCG/KG/MIN: 1 INJECTION PARENTERAL at 07:11

## 2019-01-01 RX ADMIN — EPINEPHRINE 1 MG: 0.1 INJECTION, SOLUTION ENDOTRACHEAL; INTRACARDIAC; INTRAVENOUS at 06:38

## 2019-01-01 RX ADMIN — ONDANSETRON 4 MG: 2 INJECTION INTRAMUSCULAR; INTRAVENOUS at 14:54

## 2019-01-01 RX ADMIN — SODIUM BICARBONATE 50 MEQ: 84 INJECTION, SOLUTION INTRAVENOUS at 06:50

## 2019-01-01 RX ADMIN — PANTOPRAZOLE SODIUM 40 MG: 40 INJECTION, POWDER, FOR SOLUTION INTRAVENOUS at 21:36

## 2019-01-01 RX ADMIN — EPINEPHRINE 3 MG: 0.1 INJECTION, SOLUTION ENDOTRACHEAL; INTRACARDIAC; INTRAVENOUS at 07:08

## 2019-01-01 RX ADMIN — EPINEPHRINE 1 MG: 0.1 INJECTION, SOLUTION ENDOTRACHEAL; INTRACARDIAC; INTRAVENOUS at 06:45

## 2019-01-01 RX ADMIN — TAZOBACTAM SODIUM AND PIPERACILLIN SODIUM 3.38 G: 375; 3 INJECTION, SOLUTION INTRAVENOUS at 22:35

## 2019-01-01 RX ADMIN — ROCURONIUM BROMIDE 10 MG: 10 INJECTION INTRAVENOUS at 14:42

## 2019-01-01 RX ADMIN — SODIUM CHLORIDE, PRESERVATIVE FREE 3 ML: 5 INJECTION INTRAVENOUS at 21:36

## 2019-01-01 RX ADMIN — TAZOBACTAM SODIUM AND PIPERACILLIN SODIUM 3.38 G: 375; 3 INJECTION, SOLUTION INTRAVENOUS at 06:27

## 2019-01-01 RX ADMIN — ROCURONIUM BROMIDE 20 MG: 10 INJECTION INTRAVENOUS at 13:40

## 2019-01-01 RX ADMIN — CALCIUM CHLORIDE 1 G: 100 INJECTION, SOLUTION INTRAVENOUS at 06:28

## 2019-01-01 RX ADMIN — EPINEPHRINE 1 MG: 0.1 INJECTION, SOLUTION ENDOTRACHEAL; INTRACARDIAC; INTRAVENOUS at 06:52

## 2019-01-01 RX ADMIN — FAMOTIDINE 20 MG: 10 INJECTION INTRAVENOUS at 12:30

## 2019-01-01 RX ADMIN — SODIUM CHLORIDE 125 ML/HR: 9 INJECTION, SOLUTION INTRAVENOUS at 22:38

## 2019-01-01 RX ADMIN — DILTIAZEM HYDROCHLORIDE 5 MG/HR: 5 INJECTION INTRAVENOUS at 13:10

## 2019-01-01 RX ADMIN — NEOSTIGMINE METHYLSULFATE 3.5 MG: 1 INJECTION INTRAMUSCULAR; INTRAVENOUS; SUBCUTANEOUS at 15:03

## 2019-01-01 RX ADMIN — SODIUM CHLORIDE 3000 ML: 900 IRRIGANT IRRIGATION at 13:09

## 2019-01-01 RX ADMIN — TAZOBACTAM SODIUM AND PIPERACILLIN SODIUM 3.38 G: 375; 3 INJECTION, SOLUTION INTRAVENOUS at 23:23

## 2019-01-01 RX ADMIN — SODIUM CHLORIDE 125 ML/HR: 9 INJECTION, SOLUTION INTRAVENOUS at 22:41

## 2019-01-01 RX ADMIN — SODIUM BICARBONATE 50 MEQ: 84 INJECTION, SOLUTION INTRAVENOUS at 07:03

## 2019-01-01 RX ADMIN — SODIUM BICARBONATE 50 MEQ: 84 INJECTION, SOLUTION INTRAVENOUS at 06:42

## 2019-06-17 PROBLEM — N17.9 ACUTE RENAL FAILURE (ARF) (HCC): Status: ACTIVE | Noted: 2019-01-01

## 2019-06-17 PROBLEM — E86.0 DEHYDRATION: Status: ACTIVE | Noted: 2019-01-01

## 2019-06-17 PROBLEM — R31.9 HEMATURIA: Status: ACTIVE | Noted: 2019-01-01

## 2019-06-17 PROBLEM — K56.609 BOWEL OBSTRUCTION (HCC): Status: ACTIVE | Noted: 2019-01-01

## 2019-06-17 PROBLEM — R33.8 ACUTE URINARY RETENTION: Status: ACTIVE | Noted: 2019-01-01

## 2019-06-17 PROBLEM — K40.90 REDUCIBLE LEFT INGUINAL HERNIA: Status: ACTIVE | Noted: 2019-01-01

## 2019-06-17 PROBLEM — K40.30 INCARCERATED INGUINAL HERNIA: Status: ACTIVE | Noted: 2019-01-01

## 2019-06-17 PROBLEM — E87.1 HYPONATREMIA: Status: ACTIVE | Noted: 2019-01-01

## 2019-06-18 NOTE — CONSULTS
Referring Provider: Dr. Roberto Patel  Reason for Consultation: ZULLY vs ZULLY/CKD    Subjective     Chief complaint No chief complaint on file.      History of present illness:  63 yo WM with unknown baseline serum creatinine admitted from OSH for futher eval of ZULLY, severe hyponatremia, and possible incarcerated left inguinal hernia (which was later found to be reducible, fortunately).  Admission SCR 8.9 with serum sodium 118.  Placement of Rodas catheter in the outside emergency room immediately return 2 L of bloody urine.  Follow-up chemistries are still pending.  Patient is not aware of any prior determination for abnormal kidney function, and he reports no prior abdominal surgeries or any prostate problems.  · He has felt ill for 3 weeks, with decline in appetite and some abdominal fullness; he acutely worsened 3-4 days ago when more significant abdominal pain accompanied by severe nausea and later dry heaves occurred.  He has eaten very little over the last week or 2, but states that he has managed to keep some liquids down  · No fever or chills  · He noticed a decline in urine output over the last several days, with more frequent voids that only on occasion were large-volume; denies any gross hematuria until Rodas catheter was placed earlier today  · No prior history of kidney stones or chronic NSAID use  · No chest pain or shortness of breath; no leg swelling or orthopnea  · Niece at the bedside reports no change in mental status        No past medical history on file.  No past surgical history on file.  No family history on file.  Social History     Tobacco Use   • Smoking status: Not on file   Substance Use Topics   • Alcohol use: Not on file   • Drug use: Not on file     No medications prior to admission.     Allergies:  Patient has no known allergies.    Review of Systems  14-point ROS performed and all negative except for pertinent +/-'s detailed in HPI.     Objective     Vital Signs  Temp:  [99.5 °F (37.5  °C)] 99.5 °F (37.5 °C)  Heart Rate:  [100] 100  Resp:  [18] 18  BP: (144)/(87) 144/87    Flowsheet Rows      First Filed Value   Admission Height  --   Admission Weight  79.1 kg (174 lb 4.8 oz) Documented at 06/17/2019 2018           I/O this shift:  In: -   Out: 1000 [Urine:1000]  No intake/output data recorded.    Intake/Output Summary (Last 24 hours) at 6/17/2019 2336  Last data filed at 6/17/2019 2109  Gross per 24 hour   Intake --   Output 1000 ml   Net -1000 ml       Physical Exam:  NAD; pleasant; oriented; looks younger than stated age; muscular  Dry MM; AT/NC   No eye discharge; no scleral icterus  No JVD; no carotid bruits  CTA bilat; not labored  RRR, no rub  Rodas catheter anchored  Soft, +T, +D, BS+  No edema  No clubbing  No asterixis  Moves all extremities   Mood anxious; normal affect  Speech is fluent    Results Review:        Invalid input(s): LABALBU, PROT    CrCl cannot be calculated (Patient's most recent lab result is older than the maximum 30 days allowed.).          Results from last 7 days   Lab Units 06/17/19  2231   WBC 10*3/mm3 11.48*   HEMOGLOBIN g/dL 13.1   PLATELETS 10*3/mm3 341       Results from last 7 days   Lab Units 06/17/19 2231   INR  1.24*       Active Medications    [START ON 6/18/2019] piperacillin-tazobactam 3.375 g Intravenous Q8H   sodium chloride 3 mL Intravenous Q12H       sodium chloride 125 mL/hr Last Rate: 125 mL/hr (06/17/19 2238)       Assessment/Plan   Assessment  1.  ZULLY, non-oliguric, due to obstructive uropathy: Over 2 L of urine output returned following placement of F/C.  Central volume likely low.  Hypovolemic hyponatremia, compounded by very poor solute intake for the last week or two.  Stable potassium; + NAGMA  2.  Urinary retention  3.  Reducible left inguinal hernia  4.  Large bowel obstruction: No bowel wall thickening or free air in the abdomen  5.  Gross hematuria  6.  Elevated LFTs      Acute renal failure (ARF) (CMS/HCC)    Acute urinary retention     Large bowel obstruction (CMS/HCC)    Reducible left inguinal hernia    Hyponatremia    Dehydration    Hematuria      Plan  1.  IVF for now  2.  Urology evaluation pending   3.  Serial chemistries:  awaiting first set post-f/c placement  4.  Will check TSH for completeness' sake  5.  Discussed with patient's niece at the bedside    I discussed the patient's findings and my recommendations with Aly Patel and Yoav Wong MD  06/17/19  11:36 PM

## 2019-06-18 NOTE — PLAN OF CARE
Problem: Patient Care Overview  Goal: Plan of Care Review  Outcome: Ongoing (interventions implemented as appropriate)    Goal: Individualization and Mutuality  Outcome: Ongoing (interventions implemented as appropriate)      Problem: Renal Failure/Kidney Injury, Acute (Adult)  Goal: Signs and Symptoms of Listed Potential Problems Will be Absent, Minimized or Managed (Renal Failure/Kidney Injury, Acute)  Outcome: Ongoing (interventions implemented as appropriate)      Problem: Fall Risk (Adult)  Goal: Identify Related Risk Factors and Signs and Symptoms  Outcome: Ongoing (interventions implemented as appropriate)    Goal: Absence of Fall  Outcome: Ongoing (interventions implemented as appropriate)

## 2019-06-18 NOTE — PROGRESS NOTES
LOS: 1 day     Name: Hugo Lundberg  Age/Sex: 64 y.o. male  :  1955        PCP: Edison Garland MD  No chief complaint on file.     Subjective   He is feeling a lot better today.  Denies new issues or complaints.  Denies abdominal pain.  Wants to eat.  General: No Fever or Chills, Cardiac: No Chest Pain or Palpitations, Resp: No Cough or SOA, GI: No Nausea, Vomiting, or Diarrhea and Other: No bleeding      piperacillin-tazobactam 3.375 g Intravenous Q12H   sodium chloride 3 mL Intravenous Q12H       sodium chloride 3,000 mL    sodium chloride 125 mL/hr Last Rate: 125 mL/hr (19 06)       Objective   Vital Signs  Temp:  [97.4 °F (36.3 °C)-99.5 °F (37.5 °C)] 98.1 °F (36.7 °C)  Heart Rate:  [] 137  Resp:  [18] 18  BP: (126-144)/(77-87) 139/77  Body mass index is 23.64 kg/m².    Intake/Output Summary (Last 24 hours) at 2019 1422  Last data filed at 2019 1345  Gross per 24 hour   Intake --   Output 79951 ml   Net -65703 ml       Physical Exam   Constitutional: He is oriented to person, place, and time. He appears well-developed and well-nourished. No distress.   Cardiovascular: Normal rate, regular rhythm and normal heart sounds.   Pulmonary/Chest: Effort normal and breath sounds normal.   Abdominal: Soft. Bowel sounds are normal. He exhibits distension. There is no tenderness.   Neurological: He is alert and oriented to person, place, and time.   Skin: Capillary refill takes less than 2 seconds.   Psychiatric: He has a normal mood and affect. His behavior is normal.   Nursing note and vitals reviewed.        Results Review:       I reviewed the patient's new clinical results.  Results from last 7 days   Lab Units 19  0548 19  2231   WBC 10*3/mm3 10.80 11.48*   HEMOGLOBIN g/dL 13.2 13.1   PLATELETS 10*3/mm3 344 341     Results from last 7 days   Lab Units 19  1158 19  0548 19  2356 19  2231   SODIUM mmol/L 137 135*  135* 133* 133*    POTASSIUM mmol/L 4.5 4.7  4.7 4.5 4.5   CHLORIDE mmol/L 101 98  98 97* 96*   CO2 mmol/L 21.4* 22.5  22.5 19.0* 18.0*   BUN mg/dL 56* 68*  68* 80* 84*   CREATININE mg/dL 3.39* 4.45*  4.45* 5.73* 6.08*   CALCIUM mg/dL 9.1 9.1  9.1 9.0 9.4   MAGNESIUM mg/dL  --  2.8*  --   --    PHOSPHORUS mg/dL  --  3.8  --   --    Estimated Creatinine Clearance: 25.3 mL/min (A) (by C-G formula based on SCr of 3.39 mg/dL (H)).      Assessment/Plan     Acute renal failure (ARF) (CMS/HCC)    Acute urinary retention    Large bowel obstruction (CMS/HCC)    Reducible left inguinal hernia    Hyponatremia    Dehydration    Hematuria      PLAN  -Appreciate nephrology, general surgery, and urology's assistance  -Tolerating continuous bladder irrigation hematuria is resolving  -Hemoglobin remained stable  -Renal function is improving  -Sodium has stabilized and is within normal limits this morning.  -Continue Zosyn for the time being follow-up urine culture    Disposition  To be determined but likely here through Thursday or Friday      Adriel Pinto MD  [facility]  06/18/19  2:22 PM      3:09 PM  HR remains elevated and is now irregular.  Stat EKG ordered shows atrial fibrillation with rapid ventricular response.  I have ordered an echocardiogram as well as a TSH and T4.  I also placed him on a Cardizem drip.  The nurse also feels like his abdomen on exam is somewhat changed.  She is his abdomen is firm and distended.  I feel a lot of this is probably related to the continuous bladder irrigation.  CT scan last night did not comment on small bowel obstruction.  But noted that the sigmoid colon was in the inguinal sac and there was some question for obstruction however surgeries been following the patient here in the hospital and was easily able to reduce the hernia.  He is not having any overt abdominal pain currently.  Given the elevated heart rate we will go and start him on a diltiazem drip to get his  rate controlled.  Tomorrow we can try him on oral medications but given that not entirely sure where this is headed I do not give a long-acting medication I can turn the diltiazem drop his pressure suddenly drops we have other changes that occur.  Given the hematuria I cannot start him on any anticoagulants at the current moment.  Hopefully this is just a short-lived stress driven arrhythmia that will resolve spontaneously once the Cardizem drip is started.  31 minutes of critical care provided. Time includes preparation of documents, medical consultations, review of old records, ordering and review of labs/studies, discussion with staff and family and direct bedside care. Critical care was necessary as patient was at high risk for life-threatening deterioration due to cardiac and circulatory failure.  Adriel Pinto MD

## 2019-06-18 NOTE — PAYOR COMM NOTE
"Hugo Lundberg (64 y.o. Male)     PLEASE SEE ATTACHED CLINICAL REVIEW. PER STATE SYSTEM PT. WILL HAVE PASSPORT.    PLEASE CALL  OR  [phone] WITH INPT AUTH AND DAYS APPROVED.    THANK YOU    MOE SALAZAR LPN CCP    Date of Birth Social Security Number Address Home Phone MRN    1955  [id number] [facility] [address] [phone] [id number]    Jain Marital Status          None Single       Admission Date Admission Type Admitting Provider Attending Provider Department, Room/Bed    19 Urgent Roberto Patel MD Richards, Stephen J, MD [address], [address]1    Discharge Date Discharge Disposition Discharge Destination                       Attending Provider:  Adriel Pinto MD    Allergies:  No Known Allergies    Isolation:  None   Infection:  None   Code Status:  CPR    Ht:  182.9 cm (72\")   Wt:  79.1 kg (174 lb 4.8 oz)    Admission Cmt:  None   Principal Problem:  Acute renal failure (ARF) (CMS/[facility]) [N17.9]                 Active Insurance as of 2019     Primary Coverage     Payor Plan Insurance Group Employer/Plan Group    MEDICAID PENDING MEDICAID PENDING      Payor Plan Address Payor Plan Phone Number Payor Plan Fax Number Effective Dates    [facility]   2019 - None Entered    Subscriber Name Subscriber Birth Date Member ID       HUGO LUNDBERG 1955 [id number]                 Emergency Contacts      (Rel.) Home Phone Work Phone Mobile Phone    AL BUNCH (Sister) [phone] -- --               History & Physical      Roberto Patel MD at 2019  9:53 PM          Internal medicine history and physical  INTERNAL MEDICINE   [facility]       Patient Identification:  Name: Hugo Lundberg  Age: 64 y.o.  Sex: male  :  1955  MRN: [id number]                   Primary Care Physician: Edison Garland MD                                   Chief Complaint: Starting couple " weeks ago he is having off-and-on abdominal discomfort bloated feeling frequent bowel movements and frequency and urgency in urination.  Patient has L inguinal hernia since 2012.    History of Present Illness:   Patient is not a very forthcoming historian and changes his story as he goes along.  Patient is a 64-year-old male who was in his usual state of his health until 2 weeks ago when he started noticing that he has to go to the bathroom very often to urinate.  He was also having frequency of bowel movements but no diarrhea.  Initially the whole symptom complex lasted for about 3 4 days and it slowed down making him think that it would go away and should not be a problem only for the symptoms recur back this past Monday with more frequency and in severity.  He was going to the bathroom every 5 minutes to urinate and have a bowel movement and feeling bloated.  He was getting weak and dizzy and at times felt like his been to pass out.  Since Thursday his bowel movements are becoming very loose.  With these he presented to the [facility] emergency room and was found to have sodium of 118, creatinine of 8 and urinary retention of 2 L after the placement of Rodas catheter revealing bloody urine with gross hematuria.  Patient white blood cell count was 13,000.  We were asked to admit the patient for acute renal failure and urinary retention.  Upon arrival patient was noted to have bowel obstruction on the CT scan that was performed at the outside facility as a part of evaluation of abdominal pain and it showed sigmoid colon in the left inguinal hernia sac associated with bowel obstruction.  Patient has decreased appetite and he has episodes of nausea and vomiting.  Today his symptoms were so bad and he was so weak that he eventually decided to come to the emergency room for further evaluation.      Past Medical History:  No past medical history on file.  Past Surgical History:  No past surgical history  on file.   Home Meds:  No medications prior to admission.     Current Meds:     Current Facility-Administered Medications:   •  acetaminophen (TYLENOL) tablet 650 mg, 650 mg, Oral, Q4H PRN, Roberto Patel MD  •  morphine injection 1 mg, 1 mg, Intravenous, Q4H PRN **AND** naloxone (NARCAN) injection 0.4 mg, 0.4 mg, Intravenous, Q5 Min PRN, Roberto Patel MD  •  nitroglycerin (NITROSTAT) SL tablet 0.4 mg, 0.4 mg, Sublingual, Q5 Min PRN, Roberto Patel MD  •  ondansetron (ZOFRAN) injection 4 mg, 4 mg, Intravenous, Q6H PRN, Roberto Patel MD  •  sodium chloride 0.9 % flush 3 mL, 3 mL, Intravenous, Q12H, Roberto Patel MD  •  sodium chloride 0.9 % flush 3-10 mL, 3-10 mL, Intravenous, PRN, Roberto Patel MD  •  sodium chloride 0.9 % infusion, 125 mL/hr, Intravenous, Continuous, Roberto Patel MD  Allergies:  No Known Allergies  Social History:   Social History     Tobacco Use   • Smoking status: Not on file   Substance Use Topics   • Alcohol use: Not on file      Family History:  No family history on file.       Review of Systems  See history of present illness and past medical history.   Constitutional: Remarkable for generalized weakness body aches fever and chills  Cardiovascular: Remarkable for no chest pain or shortness of breath  GI: Remarkable for frequent loose bowel movements and abdominal discomfort and bloated feeling as described above decreased appetite no hematemesis or melena.  Patient claims that he has left inguinal hernia since 2012.  : Remarkable for frequent urination small amount of urine coming out in the emergency room was noted to have urinary retention with a large amount of urinary retention up to 2 L according to the ER physician.  Patient has blood in the urine as well as frequency and urgency as mentioned above.  Musculoskeletal: Remarkable for no new joint aches and pain  Neurological: Remarkable for dizziness upon getting up walking around but denies any focal weakness.      Vitals:   /87  (BP Location: Right arm, Patient Position: Lying)   Pulse 100   Temp 99.5 °F (37.5 °C) (Oral)   Resp 18   Wt 79.1 kg (174 lb 4.8 oz)   BMI 23.64 kg/m²    I/O: No intake or output data in the 24 hours ending 06/17/19 2153  Exam:  General Appearance:   Awake cooperative and interactive male who appears tired and ill and appears to be very dry and dehydrated.   Head:    Normocephalic, without obvious abnormality, atraumatic   Eyes:    PERRL, conjunctiva/corneas clear, EOM's intact, both eyes   Ears:    Normal external ear canals, both ears   Nose:   Nares normal, septum midline, mucosa normal, no drainage    or sinus tenderness   Throat:  Dry oral mucosa   Neck:   Supple, symmetrical, trachea midline, no adenopathy;     thyroid:  no enlargement/tenderness/nodules; no carotid    bruit or JVD   Back:     Symmetric, no curvature, ROM normal, no CVA tenderness   Lungs:     Clear to auscultation bilaterally, respirations unlabored   Chest Wall:    No tenderness or deformity    Heart:    Regular rate and rhythm, S1 and S2 normal, no murmur, rub   or gallop   Abdomen:    Abdomen distended mild generalized tenderness no guarding or rigidity noted bowel sounds are positive.  Left groin shows hernia with hernial sac in the scrotum no obvious overlying erythema or skin changes noted.   Extremities:   Extremities normal, atraumatic, no cyanosis or edema   Pulses:   Pulses palpable in all extremities; symmetric all extremities   Skin:   Skin color normal, Skin is warm and dry,  no rashes or palpable lesions   Neurologic:   CNII-XII intact, motor strength grossly intact, sensation grossly intact to light touch, no focal deficits noted       Data Review:      I reviewed the patient's new clinical results.    At the outside facility reviewed and it shows WBC 13.4 hemoglobin 14.3 and platelet 361 his PT is 1.30 PT is 14.6 INR is 1.3 his urinalysis shows urine specific gravity 1005 urine pH is 93+ leuks trace nitrite -300 protein  100 glucose 40 ketone 4+ blood too numerous to count RBCs and 2-5 WBCs.  His chemistry shows glucose 109  creatinine 8.9 sodium 118 potassium 4.7 chloride 84 bicarb 16 his AST 63 and alkaline phosphatase is 52.  His lactic acid is recorded as 1.9 his blood alcohol level is less than 5   CT scan of the abdomen and pelvis revealed bowel obstruction secondary to left inguinal hernia containing proximal sigmoid colon mild distention of urinary bladder likely represent bladder outlet obstruction bilateral hydronephrosis with fat stranding surrounding the kidneys may be due to obstruction with acute pyelonephritis could not be ruled out high distance material in the left renal collecting system and renal and left ureter may reflect blood product.        Assessment:  Active Hospital Problems    Diagnosis POA   • **Acute renal failure (ARF) (CMS/HCC) [N17.9] Unknown   • Acute urinary retention [R33.8] Unknown   • Bowel obstruction (CMS/HCC) [K56.609] Unknown   • Incarcerated inguinal hernia [K40.30] Unknown   • Hyponatremia [E87.1] Unknown   • Dehydration [E86.0] Unknown   • Hematuria [R31.9] Unknown       Medical decision making:  Severe dehydration with hyponatremia and orthostatic symptoms of dizziness-plan is to admit the patient start him on IV fluids and nephrology consultation and check his BMP every 6 hours so that the correction of hyponatremia is not rapid.  Acute renal failure with  bilateral hydronephrosis and distended bladder due to bladder outlet obstruction required Rodas catheterization in the emergency room revealing bloody urine.  His acute renal failure is multifactorial and likely due to combination of dehydration and bladder outlet obstruction.  Plan is to admit the patient continue with Rodas catheter empiric antibiotic therapy until the urine culture is finalized and consult urology.  Hematuria multifactorial including bleeding in the left renal collecting system-not sure as a primary process  reaction to bladder decompression or bladder distention due to urinary retention.  Plan is to get urology consultation and consider repeating CAT scan after the placement of Rodas catheter which was done at the outside facility to see if hydronephrosis is improved and bleeding the left collecting system is ceased.  Bowel obstruction due to incarcerated left inguinal hernia containing portion of the sigmoid colon with no clear evidence of ischemic bowel at this time.  Plan is to admit the patient provide him with IV fluids and pain medication empiric antibiotic therapy for impending incarcerated hernia and get general surgery consultation.  Check lactic acid level and monitor BMP for any evolving acidosis which could be due to worsening obstruction.  Discussed with general surgery on-call through an intermediary as on-call surgeon is currently scrubbed in.  Incomplete database and unreliable historian.    Roberto Patel MD   6/17/2019  9:53 PM  Much of this encounter note is an electronic transcription/translation of spoken language to printed text. The electronic translation of spoken language may permit erroneous, or at times, nonsensical words or phrases to be inadvertently transcribed; Although I have reviewed the note for such errors, some may still exist      Electronically signed by Roberto Patel MD at 6/17/2019 10:07 PM       Emergency Department Notes     No notes of this type exist for this encounter.           Intake and Output (last 48 hours)      Intake/Output     Row Name 06/18/19 0743 06/18/19 0716 06/18/19 0632 06/18/19 0627 06/18/19 0548       sodium chloride 0.9 % infusion     Start: 06/17/19 2245    Rate  --  --  --  125 mL/hr  --       Magnesium Sulfate    Latest MgSO4 serum level  --  --  --  --  2.8  (Abnormal)        Urethral Catheter Other (Comment) 22 Fr.    Urethral Catheter Properties Placement Date: 06/17/19 Indications: Acute obstruction Placed by External Staff?: Other hospital Catheter  Type: Other (Comment) , 3 WAY IRRIGATION  Tube Size (Fr.): 22 Fr. Catheter Balloon Size: 30 mL Urine Returned: Yes    Output (mL)  1800 mL  3000 mL  4000 mL  --  --    Row Name 06/18/19 0500 06/18/19 0438 06/18/19 0130 06/17/19 2351 06/17/19 2346       Weights    Ideal Body Weight (IBW) (kg)  --  --  --  82.07  --       Urethral Catheter Other (Comment) 22 Fr.    Urethral Catheter Properties Placement Date: 06/17/19 Indications: Acute obstruction Placed by External Staff?: Other hospital Catheter Type: Other (Comment) , 3 WAY IRRIGATION  Tube Size (Fr.): 22 Fr. Catheter Balloon Size: 30 mL Urine Returned: Yes    Daily Indications  Acute retention  --  Acute retention  --  --    Site Assessment  Clean;Skin intact  --  Clean;Skin intact  --  --    Collection Container  --  --  Other (Comment) 3000cc bag  --  --    Securement Method  Tape  --  Tape  --  --    Catheter care complete  --  --  Yes  --  Yes    Irrigation/Instillation Indication  clot evacuation  --  patency maintenance;clot evacuation  --  --    Irrigation Ease  no resistance met  --  no resistance met  --  --    Output (mL)  --  4000 mL  1600 mL  --  --    Row Name 06/17/19 2259 06/17/19 2238 06/17/19 2109 06/17/19 2055 06/17/19 2018       Weights    Weight  --  --  --  --  79.1 kg (174 lb 4.8 oz)    Weight Method  --  --  --  --  Standing scale       sodium chloride 0.9 % infusion     Start: 06/17/19 2245    Rate  --  125 mL/hr  --  --  --       piperacillin-tazobactam (ZOSYN) 3.375 g in iso-osmotic dextrose 50 ml (premix)     Start: 06/17/19 2300    Dose  *3.375 g  --  --  --  --       Urine Output    Urine  --  --  1000 mL  --  --       Urethral Catheter Other (Comment) 22 Fr.    Urethral Catheter Properties Placement Date: 06/17/19 Indications: Acute obstruction Placed by External Staff?: Other hospital Catheter Type: Other (Comment) , 3 WAY IRRIGATION  Tube Size (Fr.): 22 Fr. Catheter Balloon Size: 30 mL Urine Returned: Yes    Daily Indications  --   --  --  Acute retention  --    Site Assessment  --  --  --  Clean;Skin intact  --    Collection Container  --  --  --  Standard drainage bag  --    Securement Method  --  --  --  Tape  --    Irrigation/Instillation Indication  --  --  --  urine output decreased  --        Lines, Drains & Airways    Active LDAs     Name:   Placement date:   Placement time:   Site:   Days:    Peripheral IV 06/17/19 2138 Anterior;Distal;Left Forearm   06/17/19 2138    Forearm   less than 1    Urethral Catheter Other (Comment) 22 Fr.   06/17/19    --     1                  Lab Results (all)     Procedure Component Value Units Date/Time    Basic Metabolic Panel [[id number]]  (Abnormal) Collected:  06/18/19 0548    Specimen:  Blood Updated:  06/18/19 0714     Glucose 92 mg/dL      BUN 68 mg/dL      Creatinine 4.45 mg/dL      Sodium 135 mmol/L      Potassium 4.7 mmol/L      Chloride 98 mmol/L      CO2 22.5 mmol/L      Calcium 9.1 mg/dL      eGFR  African Amer -- mL/min/1.73      Comment: <15 Indicative of kidney failure.        eGFR Non African Amer 14 mL/min/1.73      Comment: <15 Indicative of kidney failure.        BUN/Creatinine Ratio 15.3     Anion Gap 14.5 mmol/L     Narrative:       Comprehensive Metabolic Panel [[id number]]  (Abnormal) Collected:  06/18/19 0548    Specimen:  Blood Updated:  06/18/19 0706     Glucose 92 mg/dL      BUN 68 mg/dL      Creatinine 4.45 mg/dL      Sodium 135 mmol/L      Potassium 4.7 mmol/L      Chloride 98 mmol/L      CO2 22.5 mmol/L      Calcium 9.1 mg/dL      Total Protein 6.3 g/dL      Albumin 3.30 g/dL      ALT (SGPT) 38 U/L      AST (SGOT) 38 U/L      Alkaline Phosphatase 59 U/L      Total Bilirubin 0.6 mg/dL      eGFR Non African Amer 14 mL/min/1.73      Comment: <15 Indicative of kidney failure.        eGFR   Amer -- mL/min/1.73      Comment: <15 Indicative of kidney failure.        Globulin 3.0 gm/dL      A/G Ratio 1.1 g/dL      BUN/Creatinine Ratio 15.3     Anion Gap 14.5 mmol/L      Narrative:       Phosphorus [[id number]]  (Normal) Collected:  06/18/19 0548    Specimen:  Blood Updated:  06/18/19 0706     Phosphorus 3.8 mg/dL     Uric Acid [[id number]]  (Abnormal) Collected:  06/18/19 0548    Specimen:  Blood Updated:  06/18/19 0706     Uric Acid 8.2 mg/dL     Magnesium [[id number]]  (Abnormal) Collected:  06/18/19 0548    Specimen:  Blood Updated:  06/18/19 0706     Magnesium 2.8 mg/dL     CBC Auto Differential [[id number]]  (Abnormal) Collected:  06/18/19 0548    Specimen:  Blood Updated:  06/18/19 0706     WBC 10.80 10*3/mm3      RBC 4.25 10*6/mm3      Hemoglobin 13.2 g/dL      Hematocrit 39.6 %      MCV 93.2 fL      MCH 31.1 pg      MCHC 33.3 g/dL      RDW 12.8 %      RDW-SD 43.6 fl      MPV 11.3 fL      Platelets 344 10*3/mm3      Neutrophil % 81.4 %      Lymphocyte % 4.7 %      Monocyte % 13.2 %      Eosinophil % 0.0 %      Basophil % 0.2 %      Immature Grans % 0.5 %      Neutrophils, Absolute 8.79 10*3/mm3      Lymphocytes, Absolute 0.51 10*3/mm3      Monocytes, Absolute 1.43 10*3/mm3      Eosinophils, Absolute 0.00 10*3/mm3      Basophils, Absolute 0.02 10*3/mm3      Immature Grans, Absolute 0.05 10*3/mm3      nRBC 0.0 /100 WBC     Lactic Acid, Plasma [[id number]]  (Normal) Collected:  06/18/19 0548    Specimen:  Blood Updated:  06/18/19 0703     Lactate 0.8 mmol/L     Urinalysis With Culture If Indicated - Urine, Clean Catch [[id number]]  (Abnormal) Collected:  06/18/19 0113    Specimen:  Urine, Clean Catch Updated:  06/18/19 0325     Color, UA Red     Comment: Any Substance that causes an abnormal urine color can alter the accuracy of the chemical reactions.        Appearance, UA Turbid     pH, UA 6.5     Specific Gravity, UA 1.020     Glucose, UA Negative     Ketones, UA 15 mg/dL (1+)     Bilirubin, UA Negative     Blood, UA Large (3+)     Protein, UA >=300 mg/dL (3+)     Leuk Esterase, UA Trace     Nitrite, UA Positive     Urobilinogen, UA 1.0 E.U./dL    Urinalysis, Microscopic Only -  Urine, Clean Catch [[id number]]  (Abnormal) Collected:  06/18/19 0113    Specimen:  Urine, Clean Catch Updated:  06/18/19 0325     RBC, UA Too Numerous to Count /HPF      WBC, UA 6-12 /HPF      Bacteria, UA None Seen /HPF      Squamous Epithelial Cells, UA 0-2 /HPF      Hyaline Casts, UA 3-6 /LPF      Methodology Automated Microscopy    Urine Culture - Urine, Urine, Clean Catch [[id number]] Collected:  06/18/19 0113    Specimen:  Urine, Clean Catch Updated:  06/18/19 0325    Basic Metabolic Panel [[id number]]  (Abnormal) Collected:  06/17/19 2356    Specimen:  Blood Updated:  06/18/19 0028     Glucose 90 mg/dL      BUN 80 mg/dL      Creatinine 5.73 mg/dL      Sodium 133 mmol/L      Potassium 4.5 mmol/L      Chloride 97 mmol/L      CO2 19.0 mmol/L      Calcium 9.0 mg/dL      eGFR  African Amer -- mL/min/1.73      Comment: <15 Indicative of kidney failure.        eGFR Non African Amer 10 mL/min/1.73      Comment: <15 Indicative of kidney failure.        BUN/Creatinine Ratio 14.0     Anion Gap 17.0 mmol/L     Narrative:       Procalcitonin [[id number]]  (Abnormal) Collected:  06/17/19 2231    Specimen:  Blood Updated:  06/17/19 2352     Procalcitonin 1.04 ng/mL     Narrative:       Comprehensive Metabolic Panel [[id number]]  (Abnormal) Collected:  06/17/19 2231    Specimen:  Blood Updated:  06/17/19 2345     Glucose 91 mg/dL      BUN 84 mg/dL      Creatinine 6.08 mg/dL      Sodium 133 mmol/L      Potassium 4.5 mmol/L      Chloride 96 mmol/L      CO2 18.0 mmol/L      Calcium 9.4 mg/dL      Total Protein 6.6 g/dL      Albumin 3.00 g/dL      ALT (SGPT) 39 U/L      AST (SGOT) 44 U/L      Alkaline Phosphatase 57 U/L      Total Bilirubin 0.5 mg/dL      eGFR Non African Amer 9 mL/min/1.73      Comment: <15 Indicative of kidney failure.        eGFR   Amer -- mL/min/1.73      Comment: <15 Indicative of kidney failure.        Globulin 3.6 gm/dL      A/G Ratio 0.8 g/dL      BUN/Creatinine Ratio 13.8     Anion Gap 19.0 mmol/L      Narrative:       Lactic Acid, Plasma [[id number]]  (Normal) Collected:  06/17/19 2232    Specimen:  Blood Updated:  06/17/19 2325     Lactate 0.8 mmol/L     Protime-INR [[id number]]  (Abnormal) Collected:  06/17/19 2231    Specimen:  Blood Updated:  06/17/19 2315     Protime 15.3 Seconds      INR 1.24    CBC Auto Differential [[id number]]  (Abnormal) Collected:  06/17/19 2231    Specimen:  Blood Updated:  06/17/19 2307     WBC 11.48 10*3/mm3      RBC 4.24 10*6/mm3      Hemoglobin 13.1 g/dL      Hematocrit 37.8 %      MCV 89.2 fL      MCH 30.9 pg      MCHC 34.7 g/dL      RDW 12.6 %      RDW-SD 41.3 fl      MPV 11.7 fL      Platelets 341 10*3/mm3      Neutrophil % 82.6 %      Lymphocyte % 4.9 %      Monocyte % 11.8 %      Eosinophil % 0.0 %      Basophil % 0.1 %      Immature Grans % 0.6 %      Neutrophils, Absolute 9.49 10*3/mm3      Lymphocytes, Absolute 0.56 10*3/mm3      Monocytes, Absolute 1.35 10*3/mm3      Eosinophils, Absolute 0.00 10*3/mm3      Basophils, Absolute 0.01 10*3/mm3      Immature Grans, Absolute 0.07 10*3/mm3      nRBC 0.0 /100 WBC             Orders (all)     Start     Ordered    06/18/19 0000  Vital Signs  Every 4 Hours      06/17/19 2149 06/18/19 0000  Basic Metabolic Panel  Every 6 Hours     Comments:  Call abnormal results including sodium level to nephrology on call.      06/17/19 2208 06/17/19 2356  Urinalysis With Culture If Indicated - Urine, Clean Catch  Once      06/17/19 2356 06/17/19 2347  Continuous Bladder Irrigation  Until Discontinued     Comments:  Normal saline    06/17/19 2356 06/17/19 2214  Inpatient General Surgery Consult  Once     Comments:  Called left message with Rosi at answering service.   Specialty:  General Surgery  Provider:  Geovany Lua MD    06/17/19 2215 06/17/19 2147  Inpatient Nephrology Consult  Once     Specialty:  Nephrology  Provider:  Nestor Wong MD    06/17/19 2149 06/17/19 2147  Inpatient Urology Consult   Once     Specialty:  Urology  Provider:  Daniel Harris MD    06/17/19 2149 06/17/19 2146  Place Sequential Compression Device  Once      06/17/19 2149 06/17/19 2146  Maintain Sequential Compression Device  Continuous      06/17/19 2149 06/17/19 2146  Saline Lock  Continuous      06/17/19 2149 06/17/19 2146  Continuous Cardiac Monitoring  Continuous      06/17/19 2149 06/17/19 2146  Continuous Pulse Oximetry  Continuous     Comments:  For Unresponsiveness, Acute Dyspnea, Cyanosis or Suspected Hypoxemia.  Notify Physician    06/17/19 2149 06/17/19 2146  Oxygen Therapy- Nasal Cannula; Titrate for SPO2: 90% - 95%  Continuous     Comments:  For Unresponsiveness, Acute Dyspnea, Cyanosis or Suspected Hypoxemia.  Notify Physician    06/17/19 2149 06/17/19 2146  May Be Off Telemetry for Tests  Continuous      06/17/19 2149 06/17/19 2146  ACLS Protocol For Life Threatening Dysrhythmias (Unless Code Status Indicates Otherwise)  Continuous      06/17/19 2149 06/17/19 2146  Notify Provider if ACLS Protocol Activated  Until Discontinued      06/17/19 2149 06/17/19 2146  NPO Diet NPO Except: Ice Chips  Diet Effective Now      06/17/19 2149 06/17/19 2146  Inpatient General Surgery Consult  Once,   Status:  Canceled     Specialty:  General Surgery  Provider:  Geovany Lua MD    06/17/19 2149 06/17/19 2145  Code Status and Medical Interventions:  Continuous      06/17/19 2149 06/17/19 2145  Intake & Output  Every Shift      06/17/19 2149 06/17/19 2145  Weigh Patient  Once      06/17/19 2149 06/17/19 2145  Oxygen Therapy- Nasal Cannula; Titrate for SPO2: 90% - 95%  Continuous,   Status:  Canceled      06/17/19 2149 06/17/19 2145  Inpatient Admission  Once      06/17/19 2149    Unscheduled  Hand irrigate in addition to continuous irrigation.  Misc Nursing Order (Specify)  As Needed     Comments:  Hand irrigate in addition to continuous irrigation.    06/17/19 2358              Consult Notes (all)      Daniel Harris MD at 2019  5:11 AM      Consult Orders    1. Inpatient Urology Consult [[id number]] ordered by Roberto Patel MD at 19                         FIRST UROLOGY CONSULT      Patient Identification:  NAME:  Hugo Lundberg  Age:  64 y.o.   Sex:  male   :  1955   MRN:  [id number]       Chief complaint: Cannot urinate    History of present illness: 64-year-old white male with no antecedent-year-old with a history of remotely a urinary tract infection over the past week developed progressive difficulties with urination and developed a gross hematuria after spotting while he is in the emergency room at [facility] Rodas catheter was inserted with grossly bloody urine was obtained approximately 2 L he was then he was transferred to this facility where a CT scan showed bilateral hydronephrosis and bladder distention catheter was inserted with gross hematuria and had no fever chills or flank pain he has had some mild progressive difficulties with urination with slowing of stream      Past medical history:  No past medical history on file.    Past surgical history:  No past surgical history on file.    Allergies:  Patient has no known allergies.    Home medications:  No medications prior to admission.        Hospital medications:    piperacillin-tazobactam 3.375 g Intravenous Q12H   sodium chloride 3 mL Intravenous Q12H       sodium chloride 3,000 mL    sodium chloride 125 mL/hr Last Rate: 125 mL/hr (19)     •  acetaminophen  •  Morphine **AND** naloxone  •  nitroglycerin  •  ondansetron  •  sodium chloride    Family history:  No family history on file.    Social history:  Social History     Tobacco Use   • Smoking status: Not on file   Substance Use Topics   • Alcohol use: Not on file   • Drug use: Not on file       Review of systems:    Negative 12-system ROS except for the following: Fever and chills      Objective:  TMax 24 hours:   Temp  (24hrs), Av.5 °F (36.9 °C), Min:97.4 °F (36.3 °C), Max:99.5 °F (37.5 °C)      Vitals Ranges:   Temp:  [97.4 °F (36.3 °C)-99.5 °F (37.5 °C)] 97.4 °F (36.3 °C)  Heart Rate:  [] 95  Resp:  [18] 18  BP: (138-144)/(81-87) 138/81    Intake/Output Last 3 shifts:  No intake/output data recorded.     Physical Exam:       General Appearance:    Alert, cooperative, in no acute distress   Head:    Normocephalic, without obvious abnormality, atraumatic   Eyes:           conjunctivae and corneas clear   Ears:    Normal external inspection   Throat:   No oral lesions, oral mucosa moist   Neck:   Supple, no LAD, trachea midline   Back:     No CVA tenderness   Lungs:     Respirations unlabored, symmetric excursion    Heart:    RRR, intact peripheral pulses   Abdomen:     Soft, NDNT, no masses, no guarding   :    Testes descended bilaterally, no nodules, no mass. Epididymi normal.  Penis normal.     No scrotal or penile rashes noted.  Catheter was inserted with normal saline drip with a light pink to the red return easily irrigated   NIRMALA:  Extremities:  Fair tone with soft stool rectal vault prostate is about 40 cc smooth nontender symmetric benign  No edema, no deformity   Skin:   No bleeding, bruising or rashes   Neuro/Psych:   Orientation intact, mood/affect pleasant, no focal findings         Assessment:       Acute renal failure (ARF) (CMS/HCC)    Acute urinary retention    Large bowel obstruction (CMS/HCC)    Reducible left inguinal hernia    Hyponatremia    Dehydration    Hematuria    With hydronephrosis bilaterally secondary to urinary retention and acute renal failure gross hematuria    Plan:     Culture taken antibiotics have already been given patient has been started urine is already starting to clear lower abdomen is nondistended to you with the same once clinically stable evaluation accordingly regarding both hematuria and urinary retention discussed this with the patient this morning he understands agrees  to proceed    Daniel Harris MD  06/18/19  5:12 AM    Electronically signed by Daniel Harris MD at 6/18/2019  5:15 AM     Nestor Wong MD at 6/17/2019 11:36 PM            Referring Provider: Dr. Roberto Patel  Reason for Consultation: ZULLY vs ZULLY/CKD    Subjective     Chief complaint No chief complaint on file.      History of present illness:  65 yo WM with unknown baseline serum creatinine admitted from OSH for futher eval of ZULLY, severe hyponatremia, and possible incarcerated left inguinal hernia (which was later found to be reducible, fortunately).  Admission SCR 8.9 with serum sodium 118.  Placement of Rodas catheter in the outside emergency room immediately return 2 L of bloody urine.  Follow-up chemistries are still pending.  Patient is not aware of any prior determination for abnormal kidney function, and he reports no prior abdominal surgeries or any prostate problems.  · He has felt ill for 3 weeks, with decline in appetite and some abdominal fullness; he acutely worsened 3-4 days ago when more significant abdominal pain accompanied by severe nausea and later dry heaves occurred.  He has eaten very little over the last week or 2, but states that he has managed to keep some liquids down  · No fever or chills  · He noticed a decline in urine output over the last several days, with more frequent voids that only on occasion were large-volume; denies any gross hematuria until Rodas catheter was placed earlier today  · No prior history of kidney stones or chronic NSAID use  · No chest pain or shortness of breath; no leg swelling or orthopnea  · Niece at the bedside reports no change in mental status        No past medical history on file.  No past surgical history on file.  No family history on file.  Social History     Tobacco Use   • Smoking status: Not on file   Substance Use Topics   • Alcohol use: Not on file   • Drug use: Not on file     No medications prior to admission.      Allergies:  Patient has no known allergies.    Review of Systems  14-point ROS performed and all negative except for pertinent +/-'s detailed in HPI.     Objective     Vital Signs  Temp:  [99.5 °F (37.5 °C)] 99.5 °F (37.5 °C)  Heart Rate:  [100] 100  Resp:  [18] 18  BP: (144)/(87) 144/87    Flowsheet Rows      First Filed Value   Admission Height  --   Admission Weight  79.1 kg (174 lb 4.8 oz) Documented at 06/17/2019 2018           I/O this shift:  In: -   Out: 1000 [Urine:1000]  No intake/output data recorded.    Intake/Output Summary (Last 24 hours) at 6/17/2019 2336  Last data filed at 6/17/2019 2109  Gross per 24 hour   Intake --   Output 1000 ml   Net -1000 ml       Physical Exam:  NAD; pleasant; oriented; looks younger than stated age; muscular  Dry MM; AT/NC   No eye discharge; no scleral icterus  No JVD; no carotid bruits  CTA bilat; not labored  RRR, no rub  Rodas catheter anchored  Soft, +T, +D, BS+  No edema  No clubbing  No asterixis  Moves all extremities   Mood anxious; normal affect  Speech is fluent    Results Review:        Invalid input(s): LABALBU, PROT    CrCl cannot be calculated (Patient's most recent lab result is older than the maximum 30 days allowed.).      Active Medications    [START ON 6/18/2019] piperacillin-tazobactam 3.375 g Intravenous Q8H   sodium chloride 3 mL Intravenous Q12H       sodium chloride 125 mL/hr Last Rate: 125 mL/hr (06/17/19 2239)       Assessment/Plan   Assessment  1.  ZULLY, non-oliguric, due to obstructive uropathy: Over 2 L of urine output returned following placement of F/C.  Central volume likely low.  Hypovolemic hyponatremia, compounded by very poor solute intake for the last week or two.  Stable potassium; + NAGMA  2.  Urinary retention  3.  Reducible left inguinal hernia  4.  Large bowel obstruction: No bowel wall thickening or free air in the abdomen  5.  Gross hematuria  6.  Elevated LFTs      Acute renal failure (ARF) (CMS/HCC)    Acute urinary  retention    Large bowel obstruction (CMS/HCC)    Reducible left inguinal hernia    Hyponatremia    Dehydration    Hematuria      Plan  1.  IVF for now  2.  Urology evaluation pending   3.  Serial chemistries:  awaiting first set post-f/c placement  4.  Will check TSH for completeness' sake  5.  Discussed with patient's niece at the bedside    I discussed the patient's findings and my recommendations with Aly Patel and Yoav Wong MD  06/17/19  11:36 PM              Electronically signed by Nestor Wong MD at 6/17/2019 11:50 PM     Emma Cason MD at 6/17/2019 11:00 PM      Consult Orders    1. Inpatient General Surgery Consult [[id number]] ordered by Roberto Patel MD at 06/17/19 4636                General Surgery Consultation    Consulting Physician: Emma Cason MD  Referring Physician: Roberto Patel MD    Reason for consultation: Incarcerated left inguinal hernia    CC: Trouble urinating, abdominal pain, nausea    HPI:   The patient is a very pleasant 64 y.o. male that presented to [facility] emergency room with nausea, dry heaves, and abdominal pain of about one week's duration.  Symptoms have been intermittent in nature, but progressively worsened today.  He says he only had one episode of emesis about 1 week ago and otherwise his only experience dry heaves.  The abdominal pain comes and goes and tends to be diffuse in location.  He is also noticed some trouble urinating and has been feeling more bloated.  He has a left inguinal hernia that has been present for at least 12 years and has always been easily reducible.  He is able to reduce it for me this evening with no tenderness noted.  While at [facility] ER, blood work demonstrated severe hyponatremia with a sodium level of 118 and acute renal failure with a creatinine of 8, with no previous history of chronic kidney disease.  A Rodas catheter was placed, which emptied 2 L of grossly bloody urine.  He  was then transferred here after a CT of the abdomen and pelvis demonstrated a left inguinal hernia containing his sigmoid colon creating a large bowel obstruction.  On exam, the hernia is completely reducible and on review of his CT scan the hernia does not contain any portion of the urinary bladder.    Past Medical History:  None    Past Surgical History:  Tooth extraction    Medications: None    Allergies: No known drug allergies    Social History: Single, nonsmoker, rare alcohol use    Family History: No family history of GI malignancy in his parents or siblings    Review of Systems:  Constitutional: denies any weight changes, fatigue or weakness.  Eyes: denies blurred or double vision; denies scleral icterus  Cardiovascular: denies chest pain, palpitations, edemas.  Respiratory: denies cough, sputum, SOB.  Gastrointestinal:  Positive for abdominal pain, nausea, and vomiting   Genitourinary: Positive for trouble with urination, urinary retention, and hematuria  Endocrine: denies cold intolerance, lethargy and flushing.  Hematologic: denies excessive bruising or bleeding.  Musculoskeletal: denies weakness, joint swelling, pain or stiffness.  Neurologic: denies seizures, CVA, paresthesia, or peripheral neuropathy.   Skin: denies change in nevi, rashes, masses, or jaundice     All other systems reviewed and were negative.    Physical Exam:   Vitals:    06/17/19 2018   BP: 144/87   Pulse: 100   Resp: 18   Temp: 99.5 °F (37.5 °C)     GENERAL: awake and alert, no acute distress, oriented to person, place, and time  HEENT: normocephalic, atraumatic, no scleral icterus, moist mucous membranes.  NECK: Supple, there is no thyromegaly or lymphadenopathy  RESPIRATORY: clear to auscultation, no wheezes, rales or rhonchi, symmetric air entry  CARDIOVASCULAR: regular rate and rhythm    GASTROINTESTINAL: soft, distended, non-tender without peritonitis  GENITOURINARY: reducible and non-tender left inguinal hernia, testicles  descended bilaterally, nunez catheter in place with grossly bloody urine in tubing  MUSCULOSKELETAL: no cyanosis, clubbing, or edema   NEUROLOGIC: alert and oriented, normal speech, cranial nerves 2-12 grossly intact, no focal deficits   SKIN: Moist, warm, no rashes, no jaundice.      Diagnostic workup:   Pertinent labs:   Labs at [facility] showed a Na level of 118 and Cr of 8    IMAGING:  CT abd/pelvis at [facility] show a left inguinal hernia containing a portion of the sigmoid colon with proximal colonic and small bowel dilation concerning for large bowel obstruction at the level of the hernia    I personally have reviewed the above imaging and found the following additional findings: there is a large bowel obstruction at the level of the left inguinal hernia, which contains the sigmoid colon, but there is no sign of bowel wall thickening or free fluid/air within the abdomen or the hernia space      Assessment and plan:     The patient is a 64 y.o. male with a reducible left inguinal hernia in the setting of urinary retention and hematuria    His left inguinal hernia is non-tender and easily reducible. This requires no urgent surgical intervention for now. He knows to make sure the hernia stays reduced while here to prevent a recurrent bowel obstruction from developing. His acute renal failure, urinary retention, and hematuria will require further workup, and once stabilized he might be a good candidate for inguinal hernia repair but this can always be delayed for the outpatient setting. For now, his hyponatremia should be slowly corrected and his ZULLY monitored closely. Nephrology and urology have already been consulted. I will continue to follow along, but he can have his diet slowly advanced, since his large bowel obstruction should improve with the hernia completely reduced.     Emma Cason MD  General and Endoscopic Surgery  [facility]    [address] [facility]  [address]  [place], [place], [address]  P: [phone]  F: [phone]       Electronically signed by Emma Cason MD at 6/17/2019 11:18 PM         All medication doses during the admission are shown, including meds that are no longer on order.   Scheduled Meds Sorted by Name   for Hugo Lundberg as of 6/16/19 through 6/18/19     1 Day 3 Days 7 Days 10 Days < Today >    Legend:                          Inactive     Active     Other Encounter    Linked               Medications 06/16/19 06/17/19 06/18/19   piperacillin-tazobactam (ZOSYN) 3.375 g in iso-osmotic dextrose 50 ml (premix)   Dose: 3.375 g  Freq: Every 12 Hours Route: IV  Indications of Use: INTRA-ABDOMINAL INFECTION  Start: 06/18/19 0700 End: 06/25/19 0659    Admin Instructions:   Refrigerate      0700   1900          piperacillin-tazobactam (ZOSYN) 3.375 g in iso-osmotic dextrose 50 ml (premix)   Dose: 3.375 g  Freq: Once Route: IV  Start: 06/17/19 2300 End: 06/17/19 2329    Admin Instructions:   Refrigerate     2259             sodium chloride 0.9 % flush 3 mL   Dose: 3 mL  Freq: Every 12 Hours Scheduled Route: IV  Start: 06/17/19 2245     2308          0900   2100              Continuous Meds Sorted by Name   for Hugo Lundberg as of 6/16/19 through 6/18/19    Legend:                          Inactive     Active     Other Encounter    Linked               Medications 06/16/19 06/17/19 06/18/19   sodium chloride (NS) irrigation solution 3,000 mL   Dose: 3000 mL  Freq: Continuous Route: IR  Start: 06/18/19 0045      0130            sodium chloride 0.9 % infusion   Rate: 125 mL/hr Dose: 125 mL/hr  Freq: Continuous Route: IV  Last Dose: 125 mL/hr (06/18/19 0627)  Start: 06/17/19 2245     2238          0627                PRN Meds Sorted by Name   for Hugo Lundberg as of 6/16/19 through 6/18/19    Legend:                          Inactive     Active     Other Encounter    Linked               Medications 06/16/19 06/17/19 06/18/19   acetaminophen  (TYLENOL) tablet 650 mg   Dose: 650 mg  Freq: Every 4 Hours PRN Route: PO  PRN Reason: Mild Pain   Start: 06/17/19 2148    Admin Instructions:   Do not exceed 4 grams of acetaminophen in a 24 hr period.    If given for pain, use the following pain scale:   Mild Pain = Pain Score of 1-3, CPOT 1-2  Moderate Pain = Pain Score of 4-6, CPOT 3-4  Severe Pain = Pain Score of 7-10, CPOT 5-8         morphine injection 1 mg   Dose: 1 mg  Freq: Every 4 Hours PRN Route: IV  PRN Reason: Moderate Pain   Start: 06/17/19 2148 End: 06/27/19 2147    Admin Instructions:   If given for pain, use the following pain scale:  Mild Pain = Pain Score of 1-3, CPOT 1-2  Moderate Pain = Pain Score of 4-6, CPOT 3-4  Severe Pain = Pain Score of 7-10, CPOT 5-8      0126            And  naloxone (NARCAN) injection 0.4 mg   Dose: 0.4 mg  Freq: Every 5 Minutes PRN Route: IV  PRN Reason: Respiratory Depression  Start: 06/17/19 2148    Admin Instructions:   If respiratory rate is less than 8 breaths/minute or patient is difficult to arouse stop any narcotics and contact physician.   Administer slow IV push. Repeat as ordered until patient's respiratory rate is greater than 12 breaths/minute.         nitroglycerin (NITROSTAT) SL tablet 0.4 mg   Dose: 0.4 mg  Freq: Every 5 Minutes PRN Route: SL  PRN Reason: Chest Pain  PRN Comment: Chest Pain With Systolic Blood Pressure Greater Than 100  Start: 06/17/19 2145    Admin Instructions:   If Pain Unrelieved After 3 Doses, Notify Provider         ondansetron (ZOFRAN) injection 4 mg   Dose: 4 mg  Freq: Every 6 Hours PRN Route: IV  PRN Reasons: Nausea,Vomiting  Start: 06/17/19 2148    Admin Instructions:   If BOTH ondansetron (ZOFRAN) and promethazine (PHENERGAN) are ordered use ondansetron first and THEN promethazine IF ondansetron is ineffective.         sodium chloride 0.9 % flush 3-10 mL   Dose: 3-10 mL  Freq: As Needed Route: IV  PRN Reason: Line Care  Start: 06/17/19 2144         Medications 06/16/19  06/17/19 06/18/19

## 2019-06-18 NOTE — CONSULTS
General Surgery Consultation    Consulting Physician: Emma Cason MD  Referring Physician: Roberto Patel MD    Reason for consultation: Incarcerated left inguinal hernia    CC: Trouble urinating, abdominal pain, nausea    HPI:   The patient is a very pleasant 64 y.o. male that presented to [facility] emergency room with nausea, dry heaves, and abdominal pain of about one week's duration.  Symptoms have been intermittent in nature, but progressively worsened today.  He says he only had one episode of emesis about 1 week ago and otherwise his only experience dry heaves.  The abdominal pain comes and goes and tends to be diffuse in location.  He is also noticed some trouble urinating and has been feeling more bloated.  He has a left inguinal hernia that has been present for at least 12 years and has always been easily reducible.  He is able to reduce it for me this evening with no tenderness noted.  While at [facility] ER, blood work demonstrated severe hyponatremia with a sodium level of 118 and acute renal failure with a creatinine of 8, with no previous history of chronic kidney disease.  A Rodas catheter was placed, which emptied 2 L of grossly bloody urine.  He was then transferred here after a CT of the abdomen and pelvis demonstrated a left inguinal hernia containing his sigmoid colon creating a large bowel obstruction.  On exam, the hernia is completely reducible and on review of his CT scan the hernia does not contain any portion of the urinary bladder.    Past Medical History:  None    Past Surgical History:  Tooth extraction    Medications: None    Allergies: No known drug allergies    Social History: Single, nonsmoker, rare alcohol use    Family History: No family history of GI malignancy in his parents or siblings    Review of Systems:  Constitutional: denies any weight changes, fatigue or weakness.  Eyes: denies blurred or double vision; denies scleral icterus  Cardiovascular: denies  chest pain, palpitations, edemas.  Respiratory: denies cough, sputum, SOB.  Gastrointestinal:  Positive for abdominal pain, nausea, and vomiting   Genitourinary: Positive for trouble with urination, urinary retention, and hematuria  Endocrine: denies cold intolerance, lethargy and flushing.  Hematologic: denies excessive bruising or bleeding.  Musculoskeletal: denies weakness, joint swelling, pain or stiffness.  Neurologic: denies seizures, CVA, paresthesia, or peripheral neuropathy.   Skin: denies change in nevi, rashes, masses, or jaundice     All other systems reviewed and were negative.    Physical Exam:   Vitals:    06/17/19 2018   BP: 144/87   Pulse: 100   Resp: 18   Temp: 99.5 °F (37.5 °C)     GENERAL: awake and alert, no acute distress, oriented to person, place, and time  HEENT: normocephalic, atraumatic, no scleral icterus, moist mucous membranes.  NECK: Supple, there is no thyromegaly or lymphadenopathy  RESPIRATORY: clear to auscultation, no wheezes, rales or rhonchi, symmetric air entry  CARDIOVASCULAR: regular rate and rhythm    GASTROINTESTINAL: soft, distended, non-tender without peritonitis  GENITOURINARY: reducible and non-tender left inguinal hernia, testicles descended bilaterally, nunez catheter in place with grossly bloody urine in tubing  MUSCULOSKELETAL: no cyanosis, clubbing, or edema   NEUROLOGIC: alert and oriented, normal speech, cranial nerves 2-12 grossly intact, no focal deficits   SKIN: Moist, warm, no rashes, no jaundice.      Diagnostic workup:   Pertinent labs:   Labs at [facility] showed a Na level of 118 and Cr of 8    IMAGING:  CT abd/pelvis at [facility] show a left inguinal hernia containing a portion of the sigmoid colon with proximal colonic and small bowel dilation concerning for large bowel obstruction at the level of the hernia    I personally have reviewed the above imaging and found the following additional findings: there is a large bowel obstruction at the  level of the left inguinal hernia, which contains the sigmoid colon, but there is no sign of bowel wall thickening or free fluid/air within the abdomen or the hernia space      Assessment and plan:     The patient is a 64 y.o. male with a reducible left inguinal hernia in the setting of urinary retention and hematuria    His left inguinal hernia is non-tender and easily reducible. This requires no urgent surgical intervention for now. He knows to make sure the hernia stays reduced while here to prevent a recurrent bowel obstruction from developing. His acute renal failure, urinary retention, and hematuria will require further workup, and once stabilized he might be a good candidate for inguinal hernia repair but this can always be delayed for the outpatient setting. For now, his hyponatremia should be slowly corrected and his ZULLY monitored closely. Nephrology and urology have already been consulted. I will continue to follow along, but he can have his diet slowly advanced, since his large bowel obstruction should improve with the hernia completely reduced.     Emma Cason MD  General and Endoscopic Surgery  [facility]    [address], [address]  [place], [place], [address]  P: [phone]  F: [phone]

## 2019-06-18 NOTE — CONSULTS
FIRST UROLOGY CONSULT      Patient Identification:  NAME:  Hugo Lundberg  Age:  64 y.o.   Sex:  male   :  1955   MRN:  [id number]       Chief complaint: Cannot urinate    History of present illness: 64-year-old white male with no antecedent-year-old with a history of remotely a urinary tract infection over the past week developed progressive difficulties with urination and developed a gross hematuria after spotting while he is in the emergency room at [facility] Rodas catheter was inserted with grossly bloody urine was obtained approximately 2 L he was then he was transferred to this facility where a CT scan showed bilateral hydronephrosis and bladder distention catheter was inserted with gross hematuria and had no fever chills or flank pain he has had some mild progressive difficulties with urination with slowing of stream      Past medical history:  No past medical history on file.    Past surgical history:  No past surgical history on file.    Allergies:  Patient has no known allergies.    Home medications:  No medications prior to admission.        Hospital medications:    piperacillin-tazobactam 3.375 g Intravenous Q12H   sodium chloride 3 mL Intravenous Q12H       sodium chloride 3,000 mL    sodium chloride 125 mL/hr Last Rate: 125 mL/hr (19 7571)     •  acetaminophen  •  Morphine **AND** naloxone  •  nitroglycerin  •  ondansetron  •  sodium chloride    Family history:  No family history on file.    Social history:  Social History     Tobacco Use   • Smoking status: Not on file   Substance Use Topics   • Alcohol use: Not on file   • Drug use: Not on file       Review of systems:    Negative 12-system ROS except for the following: Fever and chills      Objective:  TMax 24 hours:   Temp (24hrs), Av.5 °F (36.9 °C), Min:97.4 °F (36.3 °C), Max:99.5 °F (37.5 °C)      Vitals Ranges:   Temp:  [97.4 °F (36.3 °C)-99.5 °F (37.5 °C)] 97.4 °F (36.3 °C)  Heart Rate:  [] 95  Resp:  [18]  18  BP: (138-144)/(81-87) 138/81    Intake/Output Last 3 shifts:  No intake/output data recorded.     Physical Exam:       General Appearance:    Alert, cooperative, in no acute distress   Head:    Normocephalic, without obvious abnormality, atraumatic   Eyes:           conjunctivae and corneas clear   Ears:    Normal external inspection   Throat:   No oral lesions, oral mucosa moist   Neck:   Supple, no LAD, trachea midline   Back:     No CVA tenderness   Lungs:     Respirations unlabored, symmetric excursion    Heart:    RRR, intact peripheral pulses   Abdomen:     Soft, NDNT, no masses, no guarding   :    Testes descended bilaterally, no nodules, no mass. Epididymi normal.  Penis normal.     No scrotal or penile rashes noted.  Catheter was inserted with normal saline drip with a light pink to the red return easily irrigated   NIRMALA:  Extremities:  Fair tone with soft stool rectal vault prostate is about 40 cc smooth nontender symmetric benign  No edema, no deformity   Skin:   No bleeding, bruising or rashes   Neuro/Psych:   Orientation intact, mood/affect pleasant, no focal findings       Results review:   I reviewed the patient's new clinical results.    Data review:  Lab Results (last 24 hours)     Procedure Component Value Units Date/Time    Urinalysis With Culture If Indicated - Urine, Clean Catch [[id number]]  (Abnormal) Collected:  06/18/19 0113    Specimen:  Urine, Clean Catch Updated:  06/18/19 0325     Color, UA Red     Comment: Any Substance that causes an abnormal urine color can alter the accuracy of the chemical reactions.        Appearance, UA Turbid     pH, UA 6.5     Specific Gravity, UA 1.020     Glucose, UA Negative     Ketones, UA 15 mg/dL (1+)     Bilirubin, UA Negative     Blood, UA Large (3+)     Protein, UA >=300 mg/dL (3+)     Leuk Esterase, UA Trace     Nitrite, UA Positive     Urobilinogen, UA 1.0 E.U./dL    Urinalysis, Microscopic Only - Urine, Clean Catch [[id number]]  (Abnormal)  "Collected:  06/18/19 0113    Specimen:  Urine, Clean Catch Updated:  06/18/19 0325     RBC, UA Too Numerous to Count /HPF      WBC, UA 6-12 /HPF      Bacteria, UA None Seen /HPF      Squamous Epithelial Cells, UA 0-2 /HPF      Hyaline Casts, UA 3-6 /LPF      Methodology Automated Microscopy    Urine Culture - Urine, Urine, Clean Catch [[id number]] Collected:  06/18/19 0113    Specimen:  Urine, Clean Catch Updated:  06/18/19 0325    Basic Metabolic Panel [[id number]]  (Abnormal) Collected:  06/17/19 2356    Specimen:  Blood Updated:  06/18/19 0028     Glucose 90 mg/dL      BUN 80 mg/dL      Creatinine 5.73 mg/dL      Sodium 133 mmol/L      Potassium 4.5 mmol/L      Chloride 97 mmol/L      CO2 19.0 mmol/L      Calcium 9.0 mg/dL      eGFR  African Amer -- mL/min/1.73      Comment: <15 Indicative of kidney failure.        eGFR Non African Amer 10 mL/min/1.73      Comment: <15 Indicative of kidney failure.        BUN/Creatinine Ratio 14.0     Anion Gap 17.0 mmol/L     Narrative:       GFR Normal >60  Chronic Kidney Disease <60  Kidney Failure <15    Procalcitonin [[id number]]  (Abnormal) Collected:  06/17/19 2231    Specimen:  Blood Updated:  06/17/19 2352     Procalcitonin 1.04 ng/mL     Narrative:       As a Marker for Sepsis (Non-Neonates):   1. <0.5 ng/mL represents a low risk of severe sepsis and/or septic shock.  1. >2 ng/mL represents a high risk of severe sepsis and/or septic shock.    As a Marker for Lower Respiratory Tract Infections that require antibiotic therapy:  PCT on Admission     Antibiotic Therapy             6-12 Hrs later  > 0.5                Strongly Recommended            >0.25 - <0.5         Recommended  0.1 - 0.25           Discouraged                   Remeasure/reassess PCT  <0.1                 Strongly Discouraged          Remeasure/reassess PCT      As 28 day mortality risk marker: \"Change in Procalcitonin Result\" (> 80 % or <=80 %) if Day 0 (or Day 1) and Day 4 values are available. Refer to " [email or web address]   Change in PCT <=80 %   A decrease of PCT levels below or equal to 80 % defines a positive change in PCT test result representing a higher risk for 28-day all-cause mortality of patients diagnosed with severe sepsis or septic shock.  Change in PCT > 80 %   A decrease of PCT levels of more than 80 % defines a negative change in PCT result representing a lower risk for 28-day all-cause mortality of patients diagnosed with severe sepsis or septic shock.                Comprehensive Metabolic Panel [[id number]]  (Abnormal) Collected:  06/17/19 2231    Specimen:  Blood Updated:  06/17/19 2345     Glucose 91 mg/dL      BUN 84 mg/dL      Creatinine 6.08 mg/dL      Sodium 133 mmol/L      Potassium 4.5 mmol/L      Chloride 96 mmol/L      CO2 18.0 mmol/L      Calcium 9.4 mg/dL      Total Protein 6.6 g/dL      Albumin 3.00 g/dL      ALT (SGPT) 39 U/L      AST (SGOT) 44 U/L      Alkaline Phosphatase 57 U/L      Total Bilirubin 0.5 mg/dL      eGFR Non African Amer 9 mL/min/1.73      Comment: <15 Indicative of kidney failure.        eGFR   Amer -- mL/min/1.73      Comment: <15 Indicative of kidney failure.        Globulin 3.6 gm/dL      A/G Ratio 0.8 g/dL      BUN/Creatinine Ratio 13.8     Anion Gap 19.0 mmol/L     Narrative:       GFR Normal >60  Chronic Kidney Disease <60  Kidney Failure <15    Lactic Acid, Plasma [[id number]]  (Normal) Collected:  06/17/19 2232    Specimen:  Blood Updated:  06/17/19 2325     Lactate 0.8 mmol/L     Protime-INR [[id number]]  (Abnormal) Collected:  06/17/19 2231    Specimen:  Blood Updated:  06/17/19 2315     Protime 15.3 Seconds      INR 1.24    CBC Auto Differential [[id number]]  (Abnormal) Collected:  06/17/19 2231    Specimen:  Blood Updated:  06/17/19 2307     WBC 11.48 10*3/mm3      RBC 4.24 10*6/mm3      Hemoglobin 13.1 g/dL      Hematocrit 37.8 %      MCV 89.2 fL      MCH 30.9 pg      MCHC 34.7 g/dL      RDW 12.6 %      RDW-SD 41.3 fl      MPV  11.7 fL      Platelets 341 10*3/mm3      Neutrophil % 82.6 %      Lymphocyte % 4.9 %      Monocyte % 11.8 %      Eosinophil % 0.0 %      Basophil % 0.1 %      Immature Grans % 0.6 %      Neutrophils, Absolute 9.49 10*3/mm3      Lymphocytes, Absolute 0.56 10*3/mm3      Monocytes, Absolute 1.35 10*3/mm3      Eosinophils, Absolute 0.00 10*3/mm3      Basophils, Absolute 0.01 10*3/mm3      Immature Grans, Absolute 0.07 10*3/mm3      nRBC 0.0 /100 WBC            Imaging:  Imaging Results (last 24 hours)     ** No results found for the last 24 hours. **             Assessment:       Acute renal failure (ARF) (CMS/HCC)    Acute urinary retention    Large bowel obstruction (CMS/HCC)    Reducible left inguinal hernia    Hyponatremia    Dehydration    Hematuria    With hydronephrosis bilaterally secondary to urinary retention and acute renal failure gross hematuria    Plan:     Culture taken antibiotics have already been given patient has been started urine is already starting to clear lower abdomen is nondistended to you with the same once clinically stable evaluation accordingly regarding both hematuria and urinary retention discussed this with the patient this morning he understands agrees to proceed    Daniel Harris MD  06/18/19  5:12 AM

## 2019-06-18 NOTE — H&P
Internal medicine history and physical  INTERNAL MEDICINE   [facility]       Patient Identification:  Name: Hugo Lundberg  Age: 64 y.o.  Sex: male  :  1955  MRN: [id number]                   Primary Care Physician: Edison Garland MD                                   Chief Complaint: Starting couple weeks ago he is having off-and-on abdominal discomfort bloated feeling frequent bowel movements and frequency and urgency in urination.  Patient has L inguinal hernia since .    History of Present Illness:   Patient is not a very forthcoming historian and changes his story as he goes along.  Patient is a 64-year-old male who was in his usual state of his health until 2 weeks ago when he started noticing that he has to go to the bathroom very often to urinate.  He was also having frequency of bowel movements but no diarrhea.  Initially the whole symptom complex lasted for about 3 4 days and it slowed down making him think that it would go away and should not be a problem only for the symptoms recur back this past Monday with more frequency and in severity.  He was going to the bathroom every 5 minutes to urinate and have a bowel movement and feeling bloated.  He was getting weak and dizzy and at times felt like his been to pass out.  Since Thursday his bowel movements are becoming very loose.  With these he presented to the [facility] emergency room and was found to have sodium of 118, creatinine of 8 and urinary retention of 2 L after the placement of Rodas catheter revealing bloody urine with gross hematuria.  Patient white blood cell count was 13,000.  We were asked to admit the patient for acute renal failure and urinary retention.  Upon arrival patient was noted to have bowel obstruction on the CT scan that was performed at the outside facility as a part of evaluation of abdominal pain and it showed sigmoid colon in the left inguinal hernia sac associated with  bowel obstruction.  Patient has decreased appetite and he has episodes of nausea and vomiting.  Today his symptoms were so bad and he was so weak that he eventually decided to come to the emergency room for further evaluation.      Past Medical History:  No past medical history on file.  Past Surgical History:  No past surgical history on file.   Home Meds:  No medications prior to admission.     Current Meds:     Current Facility-Administered Medications:   •  acetaminophen (TYLENOL) tablet 650 mg, 650 mg, Oral, Q4H PRN, Roberto Patel MD  •  morphine injection 1 mg, 1 mg, Intravenous, Q4H PRN **AND** naloxone (NARCAN) injection 0.4 mg, 0.4 mg, Intravenous, Q5 Min PRN, Roberto Patel MD  •  nitroglycerin (NITROSTAT) SL tablet 0.4 mg, 0.4 mg, Sublingual, Q5 Min PRN, Roberto Patel MD  •  ondansetron (ZOFRAN) injection 4 mg, 4 mg, Intravenous, Q6H PRN, Roberto Patel MD  •  sodium chloride 0.9 % flush 3 mL, 3 mL, Intravenous, Q12H, Roberto Patel MD  •  sodium chloride 0.9 % flush 3-10 mL, 3-10 mL, Intravenous, PRN, Roberto Patel MD  •  sodium chloride 0.9 % infusion, 125 mL/hr, Intravenous, Continuous, Roberto Patel MD  Allergies:  No Known Allergies  Social History:   Social History     Tobacco Use   • Smoking status: Not on file   Substance Use Topics   • Alcohol use: Not on file      Family History:  No family history on file.       Review of Systems  See history of present illness and past medical history.   Constitutional: Remarkable for generalized weakness body aches fever and chills  Cardiovascular: Remarkable for no chest pain or shortness of breath  GI: Remarkable for frequent loose bowel movements and abdominal discomfort and bloated feeling as described above decreased appetite no hematemesis or melena.  Patient claims that he has left inguinal hernia since 2012.  : Remarkable for frequent urination small amount of urine coming out in the emergency room was noted to have urinary retention with a large  amount of urinary retention up to 2 L according to the ER physician.  Patient has blood in the urine as well as frequency and urgency as mentioned above.  Musculoskeletal: Remarkable for no new joint aches and pain  Neurological: Remarkable for dizziness upon getting up walking around but denies any focal weakness.      Vitals:   /87 (BP Location: Right arm, Patient Position: Lying)   Pulse 100   Temp 99.5 °F (37.5 °C) (Oral)   Resp 18   Wt 79.1 kg (174 lb 4.8 oz)   BMI 23.64 kg/m²   I/O: No intake or output data in the 24 hours ending 06/17/19 4579  Exam:  General Appearance:   Awake cooperative and interactive male who appears tired and ill and appears to be very dry and dehydrated.   Head:    Normocephalic, without obvious abnormality, atraumatic   Eyes:    PERRL, conjunctiva/corneas clear, EOM's intact, both eyes   Ears:    Normal external ear canals, both ears   Nose:   Nares normal, septum midline, mucosa normal, no drainage    or sinus tenderness   Throat:  Dry oral mucosa   Neck:   Supple, symmetrical, trachea midline, no adenopathy;     thyroid:  no enlargement/tenderness/nodules; no carotid    bruit or JVD   Back:     Symmetric, no curvature, ROM normal, no CVA tenderness   Lungs:     Clear to auscultation bilaterally, respirations unlabored   Chest Wall:    No tenderness or deformity    Heart:    Regular rate and rhythm, S1 and S2 normal, no murmur, rub   or gallop   Abdomen:    Abdomen distended mild generalized tenderness no guarding or rigidity noted bowel sounds are positive.  Left groin shows hernia with hernial sac in the scrotum no obvious overlying erythema or skin changes noted.   Extremities:   Extremities normal, atraumatic, no cyanosis or edema   Pulses:   Pulses palpable in all extremities; symmetric all extremities   Skin:   Skin color normal, Skin is warm and dry,  no rashes or palpable lesions   Neurologic:   CNII-XII intact, motor strength grossly intact, sensation grossly  intact to light touch, no focal deficits noted       Data Review:      I reviewed the patient's new clinical results.    At the outside facility reviewed and it shows WBC 13.4 hemoglobin 14.3 and platelet 361 his PT is 1.30 PT is 14.6 INR is 1.3 his urinalysis shows urine specific gravity 1005 urine pH is 93+ leuks trace nitrite -300 protein 100 glucose 40 ketone 4+ blood too numerous to count RBCs and 2-5 WBCs.  His chemistry shows glucose 109  creatinine 8.9 sodium 118 potassium 4.7 chloride 84 bicarb 16 his AST 63 and alkaline phosphatase is 52.  His lactic acid is recorded as 1.9 his blood alcohol level is less than 5   CT scan of the abdomen and pelvis revealed bowel obstruction secondary to left inguinal hernia containing proximal sigmoid colon mild distention of urinary bladder likely represent bladder outlet obstruction bilateral hydronephrosis with fat stranding surrounding the kidneys may be due to obstruction with acute pyelonephritis could not be ruled out high distance material in the left renal collecting system and renal and left ureter may reflect blood product.        Assessment:  Active Hospital Problems    Diagnosis POA   • **Acute renal failure (ARF) (CMS/HCC) [N17.9] Unknown   • Acute urinary retention [R33.8] Unknown   • Bowel obstruction (CMS/HCC) [K56.609] Unknown   • Incarcerated inguinal hernia [K40.30] Unknown   • Hyponatremia [E87.1] Unknown   • Dehydration [E86.0] Unknown   • Hematuria [R31.9] Unknown       Medical decision making:  Severe dehydration with hyponatremia and orthostatic symptoms of dizziness-plan is to admit the patient start him on IV fluids and nephrology consultation and check his BMP every 6 hours so that the correction of hyponatremia is not rapid.  Acute renal failure with  bilateral hydronephrosis and distended bladder due to bladder outlet obstruction required Rodas catheterization in the emergency room revealing bloody urine.  His acute renal failure is  multifactorial and likely due to combination of dehydration and bladder outlet obstruction.  Plan is to admit the patient continue with Rodas catheter empiric antibiotic therapy until the urine culture is finalized and consult urology.  Hematuria multifactorial including bleeding in the left renal collecting system-not sure as a primary process reaction to bladder decompression or bladder distention due to urinary retention.  Plan is to get urology consultation and consider repeating CAT scan after the placement of Rodas catheter which was done at the outside facility to see if hydronephrosis is improved and bleeding the left collecting system is ceased.  Bowel obstruction due to incarcerated left inguinal hernia containing portion of the sigmoid colon with no clear evidence of ischemic bowel at this time.  Plan is to admit the patient provide him with IV fluids and pain medication empiric antibiotic therapy for impending incarcerated hernia and get general surgery consultation.  Check lactic acid level and monitor BMP for any evolving acidosis which could be due to worsening obstruction.  Discussed with general surgery on-call through an intermediary as on-call surgeon is currently scrubbed in.  Incomplete database and unreliable historian.    Roberto Patel MD   6/17/2019  9:53 PM  Much of this encounter note is an electronic transcription/translation of spoken language to printed text. The electronic translation of spoken language may permit erroneous, or at times, nonsensical words or phrases to be inadvertently transcribed; Although I have reviewed the note for such errors, some may still exist

## 2019-06-18 NOTE — PROGRESS NOTES
"General Surgery  Progress Note    CC: Follow-up left inguinal hernia, urinary retention with hematuria    S: He has had continuous bladder irrigation underway, with near resolution of his hematuria.  His left inguinal hernia is still soft and reducible and contains a portion of his sigmoid colon    ROS: Positive for nausea and hematuria    O:/83 (BP Location: Right arm, Patient Position: Lying)   Pulse 88   Temp 98.7 °F (37.1 °C) (Oral)   Resp 18   Ht 182.9 cm (72\")   Wt 79.1 kg (174 lb 4.8 oz)   SpO2 97%   BMI 23.64 kg/m²       Intake & Output (last day)       06/17 0701 - 06/18 0700 06/18 0701 - 06/19 0700    Urine (mL/kg/hr) 86464 6350 (25.2)    Total Output 34775 6350    Net -[id number] -[phone]                  GENERAL: alert, well appearing, and in no distress  HEENT: normocephalic, atraumatic, moist mucous membranes, clear sclera   CHEST: clear to auscultation, no wheezes, rales or rhonchi, symmetric air entry  CARDIAC: regular rate and rhythm    ABDOMEN: Soft, nontender, mild distention  GENITOURINARY: Reducible and nontender left inguinal hernia  EXTREMITIES: no cyanosis, clubbing, or edema   SKIN: Warm and moist, no rashes    LABS  Results from last 7 days   Lab Units 06/18/19  0548 06/17/19  2231   WBC 10*3/mm3 10.80 11.48*   HEMOGLOBIN g/dL 13.2 13.1   HEMATOCRIT % 39.6 37.8   PLATELETS 10*3/mm3 344 341     Results from last 7 days   Lab Units 06/18/19  0548 06/17/19  2356 06/17/19  2231   SODIUM mmol/L 135*  135* 133* 133*   POTASSIUM mmol/L 4.7  4.7 4.5 4.5   CHLORIDE mmol/L 98  98 97* 96*   CO2 mmol/L 22.5  22.5 19.0* 18.0*   BUN mg/dL 68*  68* 80* 84*   CREATININE mg/dL 4.45*  4.45* 5.73* 6.08*   CALCIUM mg/dL 9.1  9.1 9.0 9.4   BILIRUBIN mg/dL 0.6  --  0.5   ALK PHOS U/L 59  --  57   ALT (SGPT) U/L 38  --  39   AST (SGOT) U/L 38  --  44*   GLUCOSE mg/dL 92  92 90 91     Results from last 7 days   Lab Units 06/17/19  7482   INR  1.24*         A/P: 64 y.o. male with a reducible left " inguinal hernia containing a portion of his sigmoid colon in the setting of severe hyponatremia (sodium 118), acute renal failure (creatinine 8), urinary retention greater than 2 L, and hematuria    I reviewed his CT of the abdomen and pelvis from [facility] last night, and his hernia is completely unrelated to his hematuria and urinary retention.  The hernia itself does not contain any portion of his urinary bladder.  His hyponatremia is markedly improved with normal saline IV fluid resuscitation.  His hernia remains soft and reducible, and he is able to reduce it on his own.  It would be fine from my standpoint to advance his diet as tolerated.  He will ultimately require a laparoscopic left anal hernia repair, but it would be best not to do this in the setting of an active urinary tract infection which is being worked up currently.  Follow-up urine culture, and if no signs of an active UTI, we could potentially discuss elective inguinal hernia repair on this admission but would prefer to delay surgical repair of this hernia until he has made a full recovery from his acute renal failure and hyponatremia.    Emma Casno MD  General and Endoscopic Surgery  [facility]    [address], [address]  [place], [place], [address]  P: [phone]  F: [phone]

## 2019-06-19 NOTE — PLAN OF CARE
Problem: Renal Failure/Kidney Injury, Acute (Adult)  Goal: Signs and Symptoms of Listed Potential Problems Will be Absent, Minimized or Managed (Renal Failure/Kidney Injury, Acute)  Outcome: Ongoing (interventions implemented as appropriate)      Problem: Fall Risk (Adult)  Goal: Absence of Fall  Outcome: Ongoing (interventions implemented as appropriate)

## 2019-06-19 NOTE — PROGRESS NOTES
LOS: 2 days     Name: Hugo Lundberg  Age/Sex: 64 y.o. male  :  1955        PCP: Edison Garland MD  No chief complaint on file.     Subjective   He is feeling worse today.  Has had indigestion ever since he tried to eat last night.  He feels so full he can eat anything.  He is having hiccups and indigestion.  General: No Fever or Chills, Cardiac: No Chest Pain or Palpitations, Resp: No Cough or SOA, GI: No Nausea, Vomiting, or Diarrhea and Other: No bleeding      pantoprazole 40 mg Intravenous Q12H   piperacillin-tazobactam 3.375 g Intravenous Q8H   sodium chloride 3 mL Intravenous Q12H       diltiaZEM 5-15 mg/hr Last Rate: 5 mg/hr (19 1310)   sodium chloride 3,000 mL    sodium chloride 75 mL/hr Last Rate: 125 mL/hr (19 0628)       Objective   Vital Signs  Temp:  [98.7 °F (37.1 °C)-99.5 °F (37.5 °C)] 99.2 °F (37.3 °C)  Heart Rate:  [] 98  Resp:  [18-20] 20  BP: (123-154)/(79-97) 138/89  Body mass index is 23.64 kg/m².    Intake/Output Summary (Last 24 hours) at 2019 1400  Last data filed at 2019 1314  Gross per 24 hour   Intake 36542 ml   Output 01811 ml   Net -41178 ml       Physical Exam   Constitutional: He is oriented to person, place, and time. He appears well-developed and well-nourished. No distress.   Cardiovascular: Normal rate, regular rhythm and normal heart sounds.   Pulmonary/Chest: Effort normal and breath sounds normal.   Abdominal: Soft. Bowel sounds are normal. He exhibits distension. There is no tenderness.   Neurological: He is alert and oriented to person, place, and time.   Skin: Capillary refill takes less than 2 seconds.   Psychiatric: He has a normal mood and affect. His behavior is normal.   Nursing note and vitals reviewed.        Results Review:       I reviewed the patient's new clinical results.  Results from last 7 days   Lab Units 19  0611 19  0548 19  2231   WBC 10*3/mm3 11.39* 10.80 11.48*   HEMOGLOBIN g/dL 13.3  13.2 13.1   PLATELETS 10*3/mm3 364 344 341     Results from last 7 days   Lab Units 06/19/19  1154 06/19/19  0611 06/18/19  2359 06/18/19  1512 06/18/19  1158 06/18/19  0548 06/17/19  2356   SODIUM mmol/L 140 138 139 136 137 135*  135* 133*   POTASSIUM mmol/L 4.5 4.5 4.6 4.3 4.5 4.7  4.7 4.5   CHLORIDE mmol/L 103 104 102 101 101 98  98 97*   CO2 mmol/L 22.7 20.2* 21.1* 19.9* 21.4* 22.5  22.5 19.0*   BUN mg/dL 28* 34* 40* 50* 56* 68*  68* 80*   CREATININE mg/dL 1.37* 1.53* 2.21* 2.95* 3.39* 4.45*  4.45* 5.73*   CALCIUM mg/dL 8.8 9.0 8.9 9.2 9.1 9.1  9.1 9.0   MAGNESIUM mg/dL  --   --   --   --   --  2.8*  --    PHOSPHORUS mg/dL  --  2.8  --   --   --  3.8  --    Estimated Creatinine Clearance: 62.5 mL/min (A) (by C-G formula based on SCr of 1.37 mg/dL (H)).  Results from last 7 days   Lab Units 06/18/19  0113   NITRITE UA  Positive*   WBC UA /HPF 6-12*   BACTERIA UA /HPF None Seen   SQUAM EPITHEL UA /HPF 0-2   URINECX  No growth     Assessment/Plan     Acute renal failure (ARF) (CMS/HCC)    Acute urinary retention    Large bowel obstruction (CMS/HCC)    Reducible left inguinal hernia    Hyponatremia    Dehydration    Hematuria      PLAN  -Appreciate nephrology, general surgery, and urology's assistance  -Tolerating continuous bladder irrigation hematuria is resolving hopefully we can slow the continuous bladder irrigation.  Would plan to leave the Rodas in place though until after surgical repair.  -Hemoglobin remained stable  -Renal function is improving  -Sodium has stabilized and is within normal limits this morning.  -Continue Zosyn for the time being urine culture remains negative  -Discussed with Dr. Helton today.  He reviewed the imaging and plans to taken to the operating room tomorrow.    Disposition  To be determined but likely here for a bit with plans for open inguinal hernia repair      Adriel Pinto MD  [facility]  06/19/19  2:00 PM

## 2019-06-19 NOTE — PROGRESS NOTES
Follow-up inguinal hernia    Subjective:  Patient complains of nausea, fullness and mild abdominal pain.  Not really able to eat anything.  Denies any GI function.    Objective:  Patient is afebrile, heart rate 90s blood pressure 138/89  General: Awake alert and oriented, no acute distress  Gastrointestinal: Distended and firm, mildly tender, certainly no guarding or rebound  Genitourinary: Large left inguinal scrotal hernia, reducible, no overlying skin changes    Labs are reviewed.  Creatinine is returning to normal, down to 1.37 today.  White blood cell count 11.4.  Hemoglobin normal at 13.3.    CT images were reviewed by me.  It appears that his colon is distended essentially up to the point of the hernia.  There is a small amount of gas seen beyond the hernia, but beyond that point the colon is decompressed.  His plain films from last night demonstrated similar finding.    Assessment and plan:  -Reducible left inguinal hernia  -Apparent large bowel obstruction, at least partial in nature.  His hernia is reducible but it immediately protrudes back.  -I think the greatest likelihood is that his hernia is causing this partial obstruction and it seems unlikely to me it will resolve until it is straightened out.  As such, I have recommended inguinal hernia repair.  His urinalysis and urine culture came back clear.  I think the patient probably does have a slightly higher risk of infection, but I think we are really left without much other choice at this point.  I think that given his significant distention that laparoscopic approach is not james and I have recommended open left inguinal hernia repair to him.  The patient is agreeable to proceed.  If his nausea and bloating worsens, he may require nasogastric tube, at least on a temporary basis.    Juancarlos Dockery MD  General and Endoscopic Surgery  [facility]    [address], [address]  [place], [place], [address]  P: [phone]  F: [phone]

## 2019-06-19 NOTE — PROGRESS NOTES
NEPHROLOGY PROGRESS NOTE    PATIENT IDENTIFICATION:   Name:  Hugo Lundberg      MRN:  [id number]     64 y.o.  male             Reason for visit: ZULLY vs ZULLY/CKD    SUBJECTIVE:   Plan for open left inguinal hernia repair tomorrow noted. Partial large bowel obstruction per Dr. Dockery.   Bladder irrigant continues. Urine clearing. Very small bm last night.  Not passing flatus today.  Unable to eat anything. Abdomen feels very tight.   OBJECTIVE:  Vitals:    06/19/19 0354 06/19/19 0545 06/19/19 0715 06/19/19 1302   BP: 154/97 133/87 123/94 138/89   BP Location: Left arm Left arm Left arm Right arm   Patient Position: Lying Lying Lying Lying   Pulse: 96 96 95 98   Resp:    20   Temp:   98.7 °F (37.1 °C) 99.2 °F (37.3 °C)   TempSrc:   Oral Oral   SpO2: 90% 90% 91% 95%   Weight:       Height:               Body mass index is 23.64 kg/m².    Intake/Output Summary (Last 24 hours) at 6/19/2019 1653  Last data filed at 6/19/2019 1646  Gross per 24 hour   Intake 66116 ml   Output 29962 ml   Net -30112 ml     Wt Readings from Last 1 Encounters:   06/17/19 2018 79.1 kg (174 lb 4.8 oz)     Wt Readings from Last 3 Encounters:   06/17/19 79.1 kg (174 lb 4.8 oz)   01/17/14 78.9 kg (174 lb 0.2 oz)         Exam:  NAD; pleasant; oriented; looks younger than stated age. Lying in bed.   HEENTOral mucosa dry  No eye discharge; no scleral icterus  Neck No JVD  Lungs Clear to auscultation  Heart Irregularly irregular, tachycardic, no rub   irrigating Rodas catheter anchored; CBI in progress  Abd very distended. No BS, tight, mild tenderness, no guarding or rebound.  Trace body wall edema.    Ext No edema lower ext   No clubbing  No asterixis  Moves all extremities    unusual affect  Speech is fluent    Scheduled meds:      pantoprazole 40 mg Intravenous Q12H   piperacillin-tazobactam 3.375 g Intravenous Q8H   sodium chloride 3 mL Intravenous Q12H     IV meds:                          diltiaZEM 5-15 mg/hr Last Rate: 5 mg/hr  (06/19/19 1310)   sodium chloride 3,000 mL    sodium chloride 75 mL/hr Last Rate: 75 mL/hr (06/19/19 1419)       Data Review:    Results from last 7 days   Lab Units 06/19/19  1154 06/19/19  0611 06/18/19  2359  06/18/19  0548  06/17/19  2231   SODIUM mmol/L 140 138 139   < > 135*  135*   < > 133*   POTASSIUM mmol/L 4.5 4.5 4.6   < > 4.7  4.7   < > 4.5   CHLORIDE mmol/L 103 104 102   < > 98  98   < > 96*   CO2 mmol/L 22.7 20.2* 21.1*   < > 22.5  22.5   < > 18.0*   BUN mg/dL 28* 34* 40*   < > 68*  68*   < > 84*   CREATININE mg/dL 1.37* 1.53* 2.21*   < > 4.45*  4.45*   < > 6.08*   CALCIUM mg/dL 8.8 9.0 8.9   < > 9.1  9.1   < > 9.4   BILIRUBIN mg/dL  --   --   --   --  0.6  --  0.5   ALK PHOS U/L  --   --   --   --  59  --  57   ALT (SGPT) U/L  --   --   --   --  38  --  39   AST (SGOT) U/L  --   --   --   --  38  --  44*   GLUCOSE mg/dL 118* 128* 117*   < > 92  92   < > 91    < > = values in this interval not displayed.     Estimated Creatinine Clearance: 62.5 mL/min (A) (by C-G formula based on SCr of 1.37 mg/dL (H)).  Results from last 7 days   Lab Units 06/18/19  0548   URIC ACID mg/dL 8.2*     Results from last 7 days   Lab Units 06/19/19  0611 06/18/19  0548   MAGNESIUM mg/dL  --  2.8*   PHOSPHORUS mg/dL 2.8 3.8       Results from last 7 days   Lab Units 06/19/19  0611 06/18/19  0548 06/17/19  2231   WBC 10*3/mm3 11.39* 10.80 11.48*   HEMOGLOBIN g/dL 13.3 13.2 13.1   PLATELETS 10*3/mm3 364 344 341       Results from last 7 days   Lab Units 06/17/19  2231   INR  1.24*             ASSESSMENT:     Acute renal failure (ARF) (CMS/HCC)    Acute urinary retention    Large bowel obstruction (CMS/HCC)    Reducible left inguinal hernia    Hyponatremia    Dehydration    Hematuria    1.  ZULLY, non-oliguric, improving and due to obstructive uropathy: Volume status is stable. Hypovolemic hyponatremia, resolved.  k normal;  Bicarb improved.   2.  Urinary retention, bladder outlet obstruction.  Rodas, irrigation.   3.   Reducible left inguinal hernia. Plan for open repair tomorrow.   4.  Large bowel obstruction due to hernia.             PLAN:  1.  Continue IVF  2. DW wife and patient in detail.     Ryanne Moran MD  6/19/2019    4:53 PM

## 2019-06-19 NOTE — PROGRESS NOTES
Discharge Planning Assessment  [facility]     Patient Name: Hugo Lundberg  MRN: [id number]  Today's Date: 6/19/2019    Admit Date: 6/17/2019    Discharge Needs Assessment     Row Name 06/19/19 1346       Living Environment    Lives With  alone    Current Living Arrangements  home/apartment/condo    Primary Care Provided by  self    Provides Primary Care For  no one    Family Caregiver if Needed  sibling(s)    Family Caregiver Names  Christelle Guevara [phone]    Quality of Family Relationships  unable to assess    Able to Return to Prior Arrangements  yes       Resource/Environmental Concerns    Resource/Environmental Concerns  financial    Financial Concerns  insurance, none;unemployed    Transportation Concerns  car, none       Transition Planning    Patient/Family Anticipates Transition to  home with family    Patient/Family Anticipated Services at Transition  none    Transportation Anticipated  family or friend will provide       Discharge Needs Assessment    Readmission Within the Last 30 Days  no previous admission in last 30 days    Concerns to be Addressed  denies needs/concerns at this time;discharge planning    Equipment Currently Used at Home  none    Anticipated Changes Related to Illness  none    Offered/Gave Vendor List  no    Current Discharge Risk  chronically ill;lives alone        Discharge Plan     Row Name 06/19/19 1350       Plan    Plan  Home with family support -Watch for home health needs for new Rodas    Plan Comments  Met with patient at bedside. Face sheet reviewed and updated accordingly.  Prior to admission patient was living alone in his own home.  He has cane, walker and wheelchair from his mother but he does not use them.  Patient does not have a local pharmacy, discussed Meds-to-Beds program and he is agreeable to the services. Patient states his sister Christelle Guevara [phone] is his POA, but there is not paperwork on file. Discharge plans discussed, patient  has Rodas catheter, he declines need SNF or home health services.  Family to transport home. Will continue to monitor for new or changing discharge plans.        Destination      No service coordination in this encounter.      Durable Medical Equipment      No service coordination in this encounter.      Dialysis/Infusion      No service coordination in this encounter.      Home Medical Care      No service coordination in this encounter.      Therapy      No service coordination in this encounter.      Community Resources      No service coordination in this encounter.          Demographic Summary     Row Name 06/19/19 1345       General Information    Admission Type  inpatient    Arrived From  emergency department    Referral Source  admission list    Reason for Consult  discharge planning    Preferred Language  English     Used During This Interaction  no       Contact Information    Permission Granted to Share Info With  family/designee Christelle Guevara [phone]        Functional Status     Row Name 06/19/19 1345       Functional Status    Usual Activity Tolerance  good    Current Activity Tolerance  moderate       Functional Status, IADL    Medications  independent    Meal Preparation  independent       Mental Status    General Appearance WDL  WDL       Mental Status Summary    Recent Changes in Mental Status/Cognitive Functioning  no changes       Employment/    Employment Status  unemployed        Psychosocial    No documentation.       Abuse/Neglect    No documentation.       Legal    No documentation.       Substance Abuse    No documentation.       Patient Forms    No documentation.           Gina Lyles RN

## 2019-06-19 NOTE — PLAN OF CARE
Problem: Patient Care Overview  Goal: Plan of Care Review  Outcome: Ongoing (interventions implemented as appropriate)   06/18/19 2004   Coping/Psychosocial   Plan of Care Reviewed With patient   OTHER   Outcome Summary Urine clearer this pm continues to have 3 way  irrigation infusing. Abd semi-firm and distended. KUB completed this pm. Pt started on Cardizem gtt for elevated HR. HR came down to low 100s, SR/ST with Cardizem gtt.    Plan of Care Review   Progress no change     Goal: Individualization and Mutuality  Outcome: Ongoing (interventions implemented as appropriate)    Goal: Discharge Needs Assessment  Outcome: Ongoing (interventions implemented as appropriate)    Goal: Interprofessional Rounds/Family Conf  Outcome: Ongoing (interventions implemented as appropriate)      Problem: Renal Failure/Kidney Injury, Acute (Adult)  Goal: Signs and Symptoms of Listed Potential Problems Will be Absent, Minimized or Managed (Renal Failure/Kidney Injury, Acute)  Outcome: Ongoing (interventions implemented as appropriate)      Problem: Fall Risk (Adult)  Goal: Identify Related Risk Factors and Signs and Symptoms  Outcome: Outcome(s) achieved Date Met: 06/18/19    Goal: Absence of Fall  Outcome: Ongoing (interventions implemented as appropriate)

## 2019-06-19 NOTE — PROGRESS NOTES
OBSTRUCTIVE UROPATHY  GROSS HEMATURIA  SPENCER    AVSS  NO PAIN    IRRIGATION  CLEARING   LAB REVIEWED  CREATININE NORMALIZING  CULUTRE PENDING   NO ACTIVE DIURESING    AWAIT CULTURE CONT IRRIGATION

## 2019-06-19 NOTE — PLAN OF CARE
Problem: Patient Care Overview  Goal: Plan of Care Review  Outcome: Ongoing (interventions implemented as appropriate)   06/19/19 5047   Coping/Psychosocial   Plan of Care Reviewed With patient   OTHER   Outcome Summary pt is alert and oriented, room air, no falls, bladder irrigation continued, IV fluids/antibiotic, c/o of nausea, medicated PRN, cardizem drip continued, in and out of NSR and afib/flutter, continue to monitor   Plan of Care Review   Progress no change     Goal: Individualization and Mutuality  Outcome: Ongoing (interventions implemented as appropriate)    Goal: Discharge Needs Assessment  Outcome: Ongoing (interventions implemented as appropriate)      Problem: Renal Failure/Kidney Injury, Acute (Adult)  Goal: Signs and Symptoms of Listed Potential Problems Will be Absent, Minimized or Managed (Renal Failure/Kidney Injury, Acute)  Outcome: Ongoing (interventions implemented as appropriate)      Problem: Fall Risk (Adult)  Goal: Absence of Fall  Outcome: Ongoing (interventions implemented as appropriate)

## 2019-06-20 NOTE — PROGRESS NOTES
Follow-up left inguinal hernia    Subjective:  Feels about the same.  He says indigestion may be slightly better.  No flatus or bowel movement.  Denies much nausea.    Objective:  Patient is afebrile, vitals are stable  General: Awake alert and oriented, no acute distress  Eyes: Extraocular movements are intact, no scleral icterus  Abdomen: Distended and firm, minimally tender, no ventral hernia  Genitourinary: Left inguinal hernia remains reducible, but immediately falls back into the scrotum and inguinal canal    White blood cell count 11.4, hemoglobin 14.3, platelets are normal.  Chemistry looks in reasonable order today.  Sodium has normalized.  Renal function stable.  Albumin a little low at 3.0.    Assessment and plan:  -Large left inguinal hernia, likely the cause of at least a partial large bowel obstruction.  Plan for open inguinal hernia repair today.  I do not think a laparoscopic approach is practical given his abdominal distention.  -Hyponatremia, resolved  -Acute renal failure, probably related to obstructive uropathy, improved    Juancarlos Dockery MD  General and Endoscopic Surgery  [facility]    [address], [address]  [place], [place], [address]  P: [phone]  F: [phone]

## 2019-06-20 NOTE — PROGRESS NOTES
LOS: 3 days     Name: Hugo Lundberg  Age/Sex: 64 y.o. male  :  1955        PCP: Edison Garland MD  No chief complaint on file.     Subjective   He feels about the same today.  Still somewhat bloated.  Denies any nausea or vomiting but still feels distended and full.  No fevers or chills overnight no chest pain or palpitations.  General: No Fever or Chills, Cardiac: No Chest Pain or Palpitations, Resp: No Cough or SOA, GI: No Nausea, Vomiting, or Diarrhea and Other: No bleeding      pantoprazole 40 mg Intravenous Q12H   piperacillin-tazobactam 3.375 g Intravenous Q8H   sodium chloride 3 mL Intravenous Q12H   sodium phosphate IVPB 10 mmol Intravenous Once       diltiaZEM 5-15 mg/hr Last Rate: 5 mg/hr (19 1310)   sodium chloride 3,000 mL    sodium chloride 75 mL/hr Last Rate: 75 mL/hr (19 2348)       Objective   Vital Signs  Temp:  [98 °F (36.7 °C)-99.4 °F (37.4 °C)] 98 °F (36.7 °C)  Heart Rate:  [] 107  Resp:  [20] 20  BP: (133-146)/(89-95) 133/89  Body mass index is 23.64 kg/m².    Intake/Output Summary (Last 24 hours) at 2019 1022  Last data filed at 2019 0733  Gross per 24 hour   Intake 9960 ml   Output 37681 ml   Net -4490 ml       Physical Exam   Constitutional: He is oriented to person, place, and time. He appears well-developed and well-nourished. No distress.   Cardiovascular: Normal rate, regular rhythm and normal heart sounds.   Pulmonary/Chest: Effort normal and breath sounds normal.   Abdominal: Soft. Bowel sounds are normal. He exhibits distension. There is no tenderness.   Neurological: He is alert and oriented to person, place, and time.   Skin: Capillary refill takes less than 2 seconds.   Psychiatric: He has a normal mood and affect. His behavior is normal.   Nursing note and vitals reviewed.        Results Review:       I reviewed the patient's new clinical results.  Results from last 7 days   Lab Units 19  0549 19  0611 19  0548  06/17/19  2231   WBC 10*3/mm3 11.46* 11.39* 10.80 11.48*   HEMOGLOBIN g/dL 14.3 13.3 13.2 13.1   PLATELETS 10*3/mm3 377 364 344 341     Results from last 7 days   Lab Units 06/20/19  0549 06/20/19  0009 06/19/19  1755 06/19/19  1154 06/19/19  0611 06/18/19  2359 06/18/19  1512  06/18/19  0548   SODIUM mmol/L 136 134* 139 140 138 139 136   < > 135*  135*   POTASSIUM mmol/L 4.0 4.1 4.4 4.5 4.5 4.6 4.3   < > 4.7  4.7   CHLORIDE mmol/L 102 101 105 103 104 102 101   < > 98  98   CO2 mmol/L 20.5* 20.5* 21.2* 22.7 20.2* 21.1* 19.9*   < > 22.5  22.5   BUN mg/dL 26* 26* 27* 28* 34* 40* 50*   < > 68*  68*   CREATININE mg/dL 1.08 1.16 1.20 1.37* 1.53* 2.21* 2.95*   < > 4.45*  4.45*   CALCIUM mg/dL 8.8 8.7 8.9 8.8 9.0 8.9 9.2   < > 9.1  9.1   MAGNESIUM mg/dL 2.1  --   --   --   --   --   --   --  2.8*   PHOSPHORUS mg/dL 2.0*  --   --   --  2.8  --   --   --  3.8    < > = values in this interval not displayed.   Estimated Creatinine Clearance: 79.3 mL/min (by C-G formula based on SCr of 1.08 mg/dL).  Results from last 7 days   Lab Units 06/18/19  0113   NITRITE UA  Positive*   WBC UA /HPF 6-12*   BACTERIA UA /HPF None Seen   SQUAM EPITHEL UA /HPF 0-2   URINECX  No growth     Assessment/Plan     Acute renal failure (ARF) (CMS/HCC)    Acute urinary retention    Large bowel obstruction (CMS/HCC)    Reducible left inguinal hernia    Hyponatremia    Dehydration    Hematuria      PLAN  -Appreciate nephrology, general surgery, and urology's assistance  -Tolerating continuous bladder irrigation hematuria has resolved.  Following surgery if urine remains clear can probably stop continuous bladder irrigation and monitor  -Renal function is improving  -Sodium has stabilized and is within normal limits this morning.  -Continue Zosyn for the time being urine culture remains negative  -Plan to the OR today  -Creatinine is returned to baseline  -Echo reviewed shows no valvular disease, if atrial fibrillation persists following surgery  will consult cardiology to discuss anticoagulation probably 48 hours after surgical intervention.    Disposition  To be determined but likely here for a bit with plans for open inguinal hernia repair      Adriel Pinto MD  [facility]  06/20/19  10:22 AM

## 2019-06-20 NOTE — PLAN OF CARE
Problem: Patient Care Overview  Goal: Plan of Care Review  Outcome: Ongoing (interventions implemented as appropriate)   06/20/19 0531   Coping/Psychosocial   Plan of Care Reviewed With patient   OTHER   Outcome Summary pt is alert and oriented, room air, bladder irrigation continued, IV fluids/antibiotic, cardizem gtt continued, NPO for surgery today, abdomen distended, no falls, continue to monitor     Goal: Individualization and Mutuality  Outcome: Ongoing (interventions implemented as appropriate)    Goal: Discharge Needs Assessment  Outcome: Ongoing (interventions implemented as appropriate)      Problem: Renal Failure/Kidney Injury, Acute (Adult)  Goal: Signs and Symptoms of Listed Potential Problems Will be Absent, Minimized or Managed (Renal Failure/Kidney Injury, Acute)  Outcome: Ongoing (interventions implemented as appropriate)      Problem: Fall Risk (Adult)  Goal: Absence of Fall  Outcome: Ongoing (interventions implemented as appropriate)

## 2019-06-20 NOTE — OP NOTE
Operative Note :   Juancarlos Dockery MD    Hugo ZEPEDA Toño  1955    Procedure Date: 06/20/19    Pre-op Diagnosis:  Left inguinal hernia, incarcerated  Large bowel obstruction secondary to above    Post-Op Diagnosis:  Same    Procedure:   · Open left inguinal hernia repair with placement of mesh    Surgeon: Juancarlos Dockery MD    Assistant: Rosalio LEE    Anesthesia:  General (general endotracheal tube)    EBL:   25 mL    Specimens:   None    Indications:  · 64-year-old gentleman admitted with multiple medical problems including a large inguinal scrotal type hernia on the left side apparently causing a large bowel obstruction.  Patient states this hernia has been present for at least 10 years.  He had significant abdominal distention which is why I opted for an open repair.    Findings:   · Large indirect inguinal hernia with substantial amount of colon within the hernia sac    Recommendations:   · Routine postoperative care    Description of procedure:    After obtaining informed consent, the patient was brought to the operating room and placed under a general anesthetic.  I was able to partially reduce this hernia, but as soon as she would let go of it it would immediately fall back into the inguinal canal and down into the scrotum.  The patient's abdomen was clipped and then sterilely prepped and draped.  Rodas catheter had Rashid been placed a couple of days ago.  The anterior superior iliac spine and pubic tubercle were identified.  I made an incision about a centimeter above the line connecting these 2 points.  I dissected through the skin and subcutaneous tissue, through Mary Jane's fascia and then eventually down onto the exterior oblique fascia.  The external inguinal ring was identified and a large amount of contents were noted to be passing through this.  The external oblique was opened up with a #15 blade and then Metzenbaum scissors were used to extend this incision through the external oblique all  the way through the external inguinal ring.  The external oblique fascia was then grasped and lifted up superiorly and inferiorly and the underlying tissues were  bluntly.  The shelving edge of the inguinal ligament was identified and the internal oblique fibers.  I then placed my finger down onto the pubic tubercle and swept up, taking all of the hernia contents and cord contents with me.  A Penrose drain was placed around this.  I then began the tedious task of trying to separate the large hernia sac and cord contents from each other.  During the course of the dissection essentially the entire testicle was brought up into the field.  Eventually I was able to tease the colon down and off and identify the edge of the hernia sac.  This was gradually dissected away from the vas deferens and testicular vessels all the way down to the level of the internal inguinal ring.  The colon within the hernia sac was grossly distended.  With care I opened the hernia sac.  The colon within this hernia sac was quite viable and healthy but obviously distended.  The serosa of the colon as well as areas of the mesentery were stuck to the inner lining of the hernia sac.  I felt the trying to separate this all away from the hernia sac would potentially be a cause of injury to the small bowel, and as such I closed the hernia sac again with a Vicryl suture and then was able to reduce the sac and: Through the internal inguinal ring appropriate position.  This was held in place with a sponge stick.  I then selected a large Bard polypropylene keyhole mesh and then secured it medially to the pubic tubercle with a 2-0 PDS suture.  I then ran this 2-0 PDS suture out laterally connecting the inferior edge of the mesh to the shelving edge of the inguinal ligament until we were several centimeters beyond the internal inguinal ring.  I then used a second 2-0 PDS suture and ran this out superiorly again starting at the pubic tubercle and  secured along the external oblique until we were again lateral to the internal inguinal ring.  The ends of the mesh were then connected.  I checked the size of the hole around the cord structures.  I did tighten it up a little bit again using the PDS suture in order to try to prevent a recurrence.  The patient was then given a few Valsalva and actually began to cough spontaneously.  The repair appeared to be in good order.  The external oblique was then closed over the cord and mesh with 2-0 Vicryl suture, recreating the external inguinal ring.  Mary Jane's tissue was reapproximated with 2-0 Vicryl suture.  The skin was closed with a running 4-0 Monocryl subcuticular.  The patient tolerated this well.    Juancarlos Dockery MD  General and Endoscopic Surgery  [facility], [facility]    [address] [place] [address], [address]  [place], [place], [address]  P: [phone]  F: [phone]

## 2019-06-20 NOTE — ANESTHESIA PROCEDURE NOTES
Airway  Urgency: elective    Airway not difficult    General Information and Staff    Patient location during procedure: OR  Anesthesiologist: Vikram Garcia MD  CRNA: Sara Gar CRNA    Indications and Patient Condition  Indications for airway management: airway protection    Preoxygenated: yes (pt pre-O2 with 100% O2)  Mask difficulty assessment: 0 - not attempted    Final Airway Details  Final airway type: endotracheal airway      Successful airway: ETT  Cuffed: yes   Successful intubation technique: direct laryngoscopy and RSI  Facilitating devices/methods: cricoid pressure  Endotracheal tube insertion site: oral  Blade: Aixa  Blade size: 4  ETT size (mm): 7.5  Cormack-Lehane Classification: grade I - full view of glottis  Placement verified by: chest auscultation and capnometry   Cuff volume (mL): 7  Measured from: lips  ETT to lips (cm): 23  Number of attempts at approach: 1    Additional Comments  ATOETx1. No change in dentition.

## 2019-06-20 NOTE — PLAN OF CARE
Problem: Patient Care Overview  Goal: Plan of Care Review   06/20/19 2759   Coping/Psychosocial   Plan of Care Reviewed With patient   OTHER   Outcome Summary To surgery today for inguinal herna repair.   Plan of Care Review   Progress no change     Goal: Individualization and Mutuality  Outcome: Ongoing (interventions implemented as appropriate)    Goal: Discharge Needs Assessment  Outcome: Ongoing (interventions implemented as appropriate)    Goal: Interprofessional Rounds/Family Conf  Outcome: Ongoing (interventions implemented as appropriate)      Problem: Renal Failure/Kidney Injury, Acute (Adult)  Goal: Signs and Symptoms of Listed Potential Problems Will be Absent, Minimized or Managed (Renal Failure/Kidney Injury, Acute)  Outcome: Ongoing (interventions implemented as appropriate)      Problem: Fall Risk (Adult)  Goal: Absence of Fall  Outcome: Ongoing (interventions implemented as appropriate)      Problem: Skin Injury Risk (Adult)  Goal: Identify Related Risk Factors and Signs and Symptoms  Outcome: Outcome(s) achieved Date Met: 06/20/19    Goal: Skin Health and Integrity  Outcome: Ongoing (interventions implemented as appropriate)

## 2019-06-20 NOTE — ANESTHESIA PREPROCEDURE EVALUATION
Anesthesia Evaluation     Patient summary reviewed and Nursing notes reviewed                Airway   Mallampati: II  Dental      Pulmonary - negative pulmonary ROS   Cardiovascular     ECG reviewed  Rhythm: regular  Rate: normal    (+) dysrhythmias Paroxysmal Atrial Fib, Atrial Flutter,       Neuro/Psych- negative ROS  GI/Hepatic/Renal/Endo - negative ROS     Musculoskeletal (-) negative ROS    Abdominal    Substance History - negative use     OB/GYN negative ob/gyn ROS         Other                        Anesthesia Plan    ASA 3     general   Rapid sequence(Cardizem ggt for flutter/ fib   ARF resolving  RSI)  intravenous induction   Anesthetic plan, all risks, benefits, and alternatives have been provided, discussed and informed consent has been obtained with: patient.

## 2019-06-20 NOTE — ANESTHESIA POSTPROCEDURE EVALUATION
Patient: Hugo Lundberg    Procedure Summary     Date:  06/20/19 Room / Location:  [facility] OR  / [facility] MAIN OR    Anesthesia Start:  1315 Anesthesia Stop:  1528    Procedure:  LEFT INGUINAL HERNIA REPAIR WITH MESH (N/A Abdomen) Diagnosis:       Large bowel obstruction (CMS/HCC)      (Large bowel obstruction (CMS/HCC) [K56.609])    Surgeon:  Juancarlos Dockery MD Provider:  Gemma Denton MD    Anesthesia Type:  general ASA Status:  3          Anesthesia Type: general  Last vitals  BP   140/91 (06/20/19 1550)   Temp   36.9 °C (98.5 °F) (06/20/19 1523)   Pulse   93 (06/20/19 1550)   Resp   18 (06/20/19 1550)     SpO2   95 % (06/20/19 1550)     Post Anesthesia Care and Evaluation    Patient location during evaluation: PACU  Patient participation: complete - patient participated  Level of consciousness: awake and alert  Pain management: adequate  Airway patency: patent  Anesthetic complications: No anesthetic complications    Cardiovascular status: acceptable  Respiratory status: acceptable  Hydration status: acceptable    Comments: --------------------            06/20/19               1550     --------------------   BP:       140/91     Pulse:      93       Resp:       18       Temp:                SpO2:      95%      --------------------

## 2019-06-20 NOTE — ANESTHESIA POSTPROCEDURE EVALUATION
"Patient: Hugo Lundberg    Procedure Summary     Date:  06/20/19 Room / Location:  [facility] OR 06 / [facility] MAIN OR    Anesthesia Start:  1315 Anesthesia Stop:  1528    Procedure:  LEFT INGUINAL HERNIA REPAIR WITH MESH (N/A Abdomen) Diagnosis:       Large bowel obstruction (CMS/HCC)      (Large bowel obstruction (CMS/HCC) [K56.609])    Surgeon:  Juancarlos Dockery MD Provider:  Gemma Denton MD    Anesthesia Type:  general ASA Status:  3          Anesthesia Type: general  Last vitals  BP   136/90 (06/20/19 1730)   Temp   36.9 °C (98.5 °F) (06/20/19 1730)   Pulse   100 (06/20/19 1730)   Resp   18 (06/20/19 1730)     SpO2   99 % (06/20/19 1730)     Post Anesthesia Care and Evaluation    Patient location during evaluation: bedside  Patient participation: complete - patient participated  Level of consciousness: awake and alert  Pain management: adequate  Airway patency: patent  Anesthetic complications: No anesthetic complications    Cardiovascular status: acceptable  Respiratory status: acceptable  Hydration status: acceptable    Comments: /90 (BP Location: Right arm, Patient Position: Lying)   Pulse 100   Temp 36.9 °C (98.5 °F) (Oral)   Resp 18   Ht 182.9 cm (72\")   Wt 79.1 kg (174 lb 4.8 oz)   SpO2 99%   BMI 23.64 kg/m²       "

## 2019-06-20 NOTE — PROGRESS NOTES
NEPHROLOGY PROGRESS NOTE    PATIENT IDENTIFICATION:   Name:  Hugo Lundberg      MRN:  [id number]     64 y.o.  male             Reason for visit: ZULLY vs ZULLY/CKD    SUBJECTIVE:   Plan for open left inguinal hernia repair today.  Partial large bowel obstruction per Dr. Dockery.   Bladder irrigant continues. Urine clear.  No bm or flatus. Abdomen feels very tight.   OBJECTIVE:  Vitals:    06/20/19 0556 06/20/19 0717 06/20/19 0731 06/20/19 0859   BP: 146/92 133/89     BP Location: Right arm Right arm     Patient Position: Lying Lying     Pulse: 98 117 105 107   Resp:  20     Temp:  98 °F (36.7 °C)     TempSrc:  Oral     SpO2: 93% 92% 94% 91%   Weight:       Height:               Body mass index is 23.64 kg/m².    Intake/Output Summary (Last 24 hours) at 6/20/2019 0915  Last data filed at 6/20/2019 0733  Gross per 24 hour   Intake 9960 ml   Output 48336 ml   Net -6940 ml     Wt Readings from Last 1 Encounters:   06/17/19 2018 79.1 kg (174 lb 4.8 oz)     Wt Readings from Last 3 Encounters:   06/17/19 79.1 kg (174 lb 4.8 oz)   01/17/14 78.9 kg (174 lb 0.2 oz)         Exam:  NAD; pleasant; oriented; looks younger than stated age. Lying in bed.   HEENTOral mucosa dry  No eye discharge; no scleral icterus  Neck No JVD  Lungs Clear to auscultation  Heart Irregularly irregular, tachycardic, no rub   irrigating Rodas catheter anchored; CBI in progress  Abd very distended. No BS, tight, mild tenderness, no guarding or rebound.  Trace body wall edema.    Ext No edema lower ext   No clubbing  No asterixis  Moves all extremities    unusual affect  Speech is fluent    Scheduled meds:      pantoprazole 40 mg Intravenous Q12H   piperacillin-tazobactam 3.375 g Intravenous Q8H   sodium chloride 3 mL Intravenous Q12H     IV meds:                          diltiaZEM 5-15 mg/hr Last Rate: 5 mg/hr (06/19/19 1310)   sodium chloride 3,000 mL    sodium chloride 75 mL/hr Last Rate: 75 mL/hr (06/19/19 2348)       Data Review:    Results  from last 7 days   Lab Units 06/20/19  0549 06/20/19  0009 06/19/19  1755  06/18/19  0548  06/17/19 2231   SODIUM mmol/L 136 134* 139   < > 135*  135*   < > 133*   POTASSIUM mmol/L 4.0 4.1 4.4   < > 4.7  4.7   < > 4.5   CHLORIDE mmol/L 102 101 105   < > 98  98   < > 96*   CO2 mmol/L 20.5* 20.5* 21.2*   < > 22.5  22.5   < > 18.0*   BUN mg/dL 26* 26* 27*   < > 68*  68*   < > 84*   CREATININE mg/dL 1.08 1.16 1.20   < > 4.45*  4.45*   < > 6.08*   CALCIUM mg/dL 8.8 8.7 8.9   < > 9.1  9.1   < > 9.4   BILIRUBIN mg/dL 0.6  --   --   --  0.6  --  0.5   ALK PHOS U/L 58  --   --   --  59  --  57   ALT (SGPT) U/L 39  --   --   --  38  --  39   AST (SGOT) U/L 24  --   --   --  38  --  44*   GLUCOSE mg/dL 105* 107* 105*   < > 92  92   < > 91    < > = values in this interval not displayed.     Estimated Creatinine Clearance: 79.3 mL/min (by C-G formula based on SCr of 1.08 mg/dL).  Results from last 7 days   Lab Units 06/18/19  0548   URIC ACID mg/dL 8.2*     Results from last 7 days   Lab Units 06/20/19  0549 06/19/19  0611 06/18/19  0548   MAGNESIUM mg/dL 2.1  --  2.8*   PHOSPHORUS mg/dL 2.0* 2.8 3.8       Results from last 7 days   Lab Units 06/20/19  0549 06/19/19  0611 06/18/19  0548 06/17/19 2231   WBC 10*3/mm3 11.46* 11.39* 10.80 11.48*   HEMOGLOBIN g/dL 14.3 13.3 13.2 13.1   PLATELETS 10*3/mm3 377 364 344 341       Results from last 7 days   Lab Units 06/17/19  2231   INR  1.24*             ASSESSMENT:     Acute renal failure (ARF) (CMS/HCC)    Acute urinary retention    Large bowel obstruction (CMS/HCC)    Reducible left inguinal hernia    Hyponatremia    Dehydration    Hematuria    1.  ZULLY, non-oliguric, improving and due to obstructive uropathy: Volume status is stable. Hypovolemic hyponatremia, resolved.  k normal; Non gap metabolic acidosis .  Replace phos.   2.  Urinary retention, bladder outlet obstruction.  Rodas, irrigation.   3.  Reducible left inguinal hernia with large bowel obstruction. Plan for open  repair today,        PLAN:  1.  Continue IVF  2.  Replace Phos.     Ryanne Moran MD  6/20/2019    9:15 AM

## 2019-06-21 PROBLEM — I48.91 ATRIAL FIBRILLATION (HCC): Status: ACTIVE | Noted: 2019-01-01

## 2019-06-21 NOTE — CODE DOCUMENTATION
After successful ROSC, patient was intubated and prepared for transport to CCU. However he coded again before they could initiate transport.  When I arrived CPR in progress. Given epi and atropine with total 3 rounds of epi and 1 bicarb. ROSC achieved.  Fluids going in. Will call for Archie drip.

## 2019-06-21 NOTE — CODE DOCUMENTATION
Overhead code blue called. On arrival CPR in progress. Given 1 round of epinephrine and atropine. K was 4.9 therefore given 1 amp calcium. ROSC achieved.

## 2019-06-21 NOTE — PLAN OF CARE
Problem: Patient Care Overview  Goal: Plan of Care Review  Outcome: Ongoing (interventions implemented as appropriate)   06/21/19 0531   Coping/Psychosocial   Plan of Care Reviewed With patient   OTHER   Outcome Summary pt is alert and oriented, 2L o2 nasal cannula, no falls, bed alarm on, IV fluids/antibiotic, cardizem gtt continued, bladder irrigation continued, NG tube in place, not much output, surgical dressing C/D/I, continue to monitor   Plan of Care Review   Progress improving     Goal: Individualization and Mutuality  Outcome: Ongoing (interventions implemented as appropriate)    Goal: Discharge Needs Assessment  Outcome: Ongoing (interventions implemented as appropriate)      Problem: Renal Failure/Kidney Injury, Acute (Adult)  Goal: Signs and Symptoms of Listed Potential Problems Will be Absent, Minimized or Managed (Renal Failure/Kidney Injury, Acute)  Outcome: Ongoing (interventions implemented as appropriate)      Problem: Fall Risk (Adult)  Goal: Absence of Fall  Outcome: Ongoing (interventions implemented as appropriate)

## 2019-06-21 NOTE — PAYOR COMM NOTE
"UR CONTACT:  MANUEL P: [phone]       F: [phone]           Hugo Lundberg (64 y.o. Male)     Date of Birth Social Security Number Address Home Phone MRN    1955  [id number] [address] [phone] [id number]    [employer] Marital Status          None Single       Admission Date Admission Type Admitting Provider Attending Provider Department, Room/Bed    6/17/19 Urgent Roberto Patel MD Richards, Stephen J, MD [facility] CORONARY CARE, N328/1    Discharge Date Discharge Disposition Discharge Destination                       Attending Provider:  Adriel Pinto MD    Allergies:  No Known Allergies    Isolation:  None   Infection:  None   Code Status:  CPR    Ht:  182.9 cm (72\")   Wt:  79.1 kg (174 lb 4.8 oz)    Admission Cmt:  None   Principal Problem:  Acute renal failure (ARF) (CMS/[facility]) [N17.9]                 Active Insurance as of 6/17/2019     Primary Coverage     Payor Plan Insurance Group Employer/Plan Group    [employer] [facility]AN [facility]     Payor Plan Address Payor Plan Phone Number Payor Plan Fax Number Effective Dates    [address] [phone]  6/14/2019 - None Entered    [facility] [id number]       Subscriber Name Subscriber Birth Date Member ID       HUGO LUNDBERG 1955 [id number]                 Emergency Contacts      (Rel.) Home Phone Work Phone Mobile Phone    AL BUNCH (Sister) [phone] -- --    Karla Pratt (Other) -- -- [phone]            ICU Vital Signs     Row Name 06/21/19 0732 06/21/19 0713 06/21/19 07:11:05 06/21/19 0711 06/21/19 0707       Vitals    Pulse  --  150  (Abnormal)   --  --  --    Heart Rate Source  --  Monitor  --  --  --    Resp Rate (Observed) Vent  28  --  --  --  --    BP  --  143/60  --  --  --       Oxygen Therapy    ETCO2 (mmHg)  --  40 mmHg  30 mmHg  24 mmHg  13 mmHg        Lines, Drains & Airways    Active LDAs     Name:   Placement date:   Placement time:   Site:   " Days:    Peripheral IV 06/17/19 2138 Anterior;Distal;Left Forearm   06/17/19 2138    Forearm   3    Peripheral IV 06/18/19 2000 Left Antecubital   06/18/19 present on assessment    2000    Antecubital   2    NG/OG Tube Nasogastric 18 Fr Right nostril   06/20/19    1458    Right nostril   less than 1    Urethral Catheter Other (Comment) 22 Fr.   06/17/19    --     4    ETT    06/21/19    0630     less than 1                Hospital Medications (active)       Dose Frequency Start End    acetaminophen (TYLENOL) tablet 650 mg 650 mg Every 4 Hours PRN 6/17/2019     Sig - Route: Take 2 tablets by mouth Every 4 (Four) Hours As Needed for Mild Pain . - Oral    atropine injection  Code / Trauma / Sedation Medication 6/21/2019 6/21/2019    Sig: Emergency Use.    atropine injection  Code / Trauma / Sedation Medication 6/21/2019 6/21/2019    Sig: Emergency Use.    calcium chloride injection  Code / Trauma / Sedation Medication 6/21/2019 6/21/2019    Sig - Route: Infuse  into a venous catheter Emergency Use. - Intravenous    diltiaZEM (CARDIZEM) 125mg/125 mL infusion 5-15 mg/hr Titrated 6/18/2019     Sig - Route: Infuse 5-15 mg/hr into a venous catheter Dose Adjusted By Provider As Needed. - Intravenous    EPINEPHrine (ADRENALIN) 5,000 mcg in sodium chloride 0.9 % 250 mL (20 mcg/mL) infusion 0.02-0.5 mcg/kg/min × 79.1 kg Titrated 6/21/2019     Sig - Route: Infuse 1.582-39.55 mcg/min into a venous catheter Dose Adjusted By Provider As Needed. - Intravenous    EPINEPHrine PF (ADRENALIN) injection  Code / Trauma / Sedation Medication 6/21/2019 6/21/2019    Sig: Emergency Use.    EPINEPHrine PF (ADRENALIN) injection  Code / Trauma / Sedation Medication 6/21/2019 6/21/2019    Sig: Emergency Use.    EPINEPHrine PF (ADRENALIN) injection  Code / Trauma / Sedation Medication 6/21/2019 6/21/2019    Sig: Emergency Use.    famotidine (PEPCID) injection 20 mg 20 mg Once 6/20/2019 6/20/2019    Sig - Route: Infuse 2 mL into a venous  "catheter 1 (One) Time. - Intravenous    heparin (porcine) 5000 UNIT/ML injection 6,300 Units 80 Units/kg × 79.1 kg Once 6/21/2019     Sig - Route: Infuse 1.26 mL into a venous catheter 1 (One) Time. - Intravenous    heparin 76041 units/250 mL (100 units/mL) in 0.45 % NaCl infusion 18 Units/kg/hr × 79.1 kg Titrated 6/21/2019     Sig - Route: Infuse 1,423 Units/hr into a venous catheter Dose Adjusted By Provider As Needed. - Intravenous    HYDROmorphone (DILAUDID) injection 0.5 mg 0.5 mg Every 2 Hours PRN 6/20/2019 6/30/2019    Sig - Route: Infuse 0.5 mL into a venous catheter Every 2 (Two) Hours As Needed for Severe Pain . - Intravenous    magnesium sulfate in D5W 1g/100mL (PREMIX)  Code / Trauma / Sedation Continuous Med 6/21/2019 6/21/2019    Sig - Route: Infuse  into a venous catheter Emergency Use. - Intravenous    morphine injection 1 mg 1 mg Every 4 Hours PRN 6/17/2019 6/27/2019    Sig - Route: Infuse 0.5 mL into a venous catheter Every 4 (Four) Hours As Needed for Moderate Pain . - Intravenous    Linked Group 1:  \"And\" Linked Group Details        naloxone (NARCAN) injection 0.4 mg 0.4 mg Every 5 Minutes PRN 6/17/2019     Sig - Route: Infuse 1 mL into a venous catheter Every 5 (Five) Minutes As Needed for Respiratory Depression. - Intravenous    Linked Group 1:  \"And\" Linked Group Details        nitroglycerin (NITROSTAT) SL tablet 0.4 mg 0.4 mg Every 5 Minutes PRN 6/17/2019     Sig - Route: Place 1 tablet under the tongue Every 5 (Five) Minutes As Needed for Chest Pain (Chest Pain With Systolic Blood Pressure Greater Than 100). - Sublingual    ondansetron (ZOFRAN) injection 4 mg 4 mg Every 6 Hours PRN 6/17/2019     Sig - Route: Infuse 2 mL into a venous catheter Every 6 (Six) Hours As Needed for Nausea or Vomiting. - Intravenous    pantoprazole (PROTONIX) injection 40 mg 40 mg Every 12 Hours Scheduled 6/19/2019     Sig - Route: Infuse 10 mL into a venous catheter Every 12 (Twelve) Hours. - Intravenous    " phenylephrine (MARILIA-SYNEPHRINE) 50 mg in sodium chloride 0.9 % 250 mL (0.2 mg/mL) infusion 0.5-3 mcg/kg/min × 79.1 kg Titrated 6/21/2019     Sig - Route: Infuse 39..3 mcg/min into a venous catheter Dose Adjusted By Provider As Needed. - Intravenous    piperacillin-tazobactam (ZOSYN) 3.375 g in iso-osmotic dextrose 50 ml (premix) 3.375 g Every 8 Hours 6/18/2019 6/25/2019    Sig - Route: Infuse 50 mL into a venous catheter Every 8 (Eight) Hours. - Intravenous    Propofol (DIPRIVAN) injection  Code / Trauma / Sedation Medication 6/21/2019 6/21/2019    Sig - Route: Infuse  into a venous catheter Emergency Use. - Intravenous    sodium bicarbonate injection 8.4%  Code / Trauma / Sedation Medication 6/21/2019 6/21/2019    Sig - Route: Infuse  into a venous catheter Emergency Use. - Intravenous    sodium bicarbonate injection 8.4%  Code / Trauma / Sedation Medication 6/21/2019 6/21/2019    Sig - Route: Infuse  into a venous catheter Emergency Use. - Intravenous    sodium chloride (NS) irrigation solution 3,000 mL 3,000 mL Continuous 6/18/2019     Sig - Route: Irrigate with 3,000 mL to the affected area as directed by provider Continuous. - Irrigation    sodium chloride 0.9 % flush 3 mL 3 mL Every 12 Hours Scheduled 6/17/2019     Sig - Route: Infuse 3 mL into a venous catheter Every 12 (Twelve) Hours. - Intravenous    sodium chloride 0.9 % flush 3-10 mL 3-10 mL As Needed 6/17/2019     Sig - Route: Infuse 3-10 mL into a venous catheter As Needed for Line Care. - Intravenous    sodium chloride 0.9 % infusion 125 mL/hr Continuous 6/17/2019     Sig - Route: Infuse 125 mL/hr into a venous catheter Continuous. - Intravenous    sodium phosphates 10 mmol in sodium chloride 0.9 % 250 mL IVPB 10 mmol Once 6/20/2019 6/20/2019    Sig - Route: Infuse 10 mmol into a venous catheter 1 (One) Time. - Intravenous          Operative/Procedure Notes       Steven Perdue MD at 6/21/2019  6:44 AM        Indication: respiratory failure    After  sedation with propofol, cords were directly visualized with Aixa 4. Thereafter endotracheal tube size 8 was placed through the cords. Position was confirmed with bilateral air entry and change of color on capnometer. There were no complications of procedure.    Ventilator settings given to respiratory therapist. Stat portable chest x-ray has been requested and will be reviewed.      Electronically signed by Steven Perdue MD at 6/21/2019  6:44 AM     Juancarlos Dockery MD at 6/20/2019  1:43 PM        Operative Note :   MD Hugo Hoffmann  1955    Procedure Date: 06/20/19    Pre-op Diagnosis:  Left inguinal hernia, incarcerated  Large bowel obstruction secondary to above    Post-Op Diagnosis:  Same    Procedure:   · Open left inguinal hernia repair with placement of mesh    Surgeon: Juancarlos Dockery MD    Assistant: Rosalio LEE    Anesthesia:  General (general endotracheal tube)    EBL:   25 mL    Specimens:   None    Indications:  · 64-year-old gentleman admitted with multiple medical problems including a large inguinal scrotal type hernia on the left side apparently causing a large bowel obstruction.  Patient states this hernia has been present for at least 10 years.  He had significant abdominal distention which is why I opted for an open repair.    Findings:   · Large indirect inguinal hernia with substantial amount of colon within the hernia sac    Recommendations:   · Routine postoperative care    Description of procedure:    After obtaining informed consent, the patient was brought to the operating room and placed under a general anesthetic.  I was able to partially reduce this hernia, but as soon as she would let go of it it would immediately fall back into the inguinal canal and down into the scrotum.  The patient's abdomen was clipped and then sterilely prepped and draped.  Rodas catheter had Rashid been placed a couple of days ago.  The anterior superior iliac spine and  pubic tubercle were identified.  I made an incision about a centimeter above the line connecting these 2 points.  I dissected through the skin and subcutaneous tissue, through Mary Jane's fascia and then eventually down onto the exterior oblique fascia.  The external inguinal ring was identified and a large amount of contents were noted to be passing through this.  The external oblique was opened up with a #15 blade and then Metzenbaum scissors were used to extend this incision through the external oblique all the way through the external inguinal ring.  The external oblique fascia was then grasped and lifted up superiorly and inferiorly and the underlying tissues were  bluntly.  The shelving edge of the inguinal ligament was identified and the internal oblique fibers.  I then placed my finger down onto the pubic tubercle and swept up, taking all of the hernia contents and cord contents with me.  A Penrose drain was placed around this.  I then began the tedious task of trying to separate the large hernia sac and cord contents from each other.  During the course of the dissection essentially the entire testicle was brought up into the field.  Eventually I was able to tease the colon down and off and identify the edge of the hernia sac.  This was gradually dissected away from the vas deferens and testicular vessels all the way down to the level of the internal inguinal ring.  The colon within the hernia sac was grossly distended.  With care I opened the hernia sac.  The colon within this hernia sac was quite viable and healthy but obviously distended.  The serosa of the colon as well as areas of the mesentery were stuck to the inner lining of the hernia sac.  I felt the trying to separate this all away from the hernia sac would potentially be a cause of injury to the small bowel, and as such I closed the hernia sac again with a Vicryl suture and then was able to reduce the sac and: Through the internal inguinal  ring appropriate position.  This was held in place with a sponge stick.  I then selected a large Bard polypropylene keyhole mesh and then secured it medially to the pubic tubercle with a 2-0 PDS suture.  I then ran this 2-0 PDS suture out laterally connecting the inferior edge of the mesh to the shelving edge of the inguinal ligament until we were several centimeters beyond the internal inguinal ring.  I then used a second 2-0 PDS suture and ran this out superiorly again starting at the pubic tubercle and secured along the external oblique until we were again lateral to the internal inguinal ring.  The ends of the mesh were then connected.  I checked the size of the hole around the cord structures.  I did tighten it up a little bit again using the PDS suture in order to try to prevent a recurrence.  The patient was then given a few Valsalva and actually began to cough spontaneously.  The repair appeared to be in good order.  The external oblique was then closed over the cord and mesh with 2-0 Vicryl suture, recreating the external inguinal ring.  Mary Jane's tissue was reapproximated with 2-0 Vicryl suture.  The skin was closed with a running 4-0 Monocryl subcuticular.  The patient tolerated this well.    Juancarlos Dockery MD  General and Endoscopic Surgery  [facility]    [address] [place] [address], [address]  [place], [place], [facility]  P: [phone]  F: [phone]          Electronically signed by Juancarlos Dockery MD at 6/20/2019  3:27 PM          Physician Progress Notes       Nestor oWng MD at 6/21/2019  8:24 AM              NEPHROLOGY PROGRESS NOTE    PATIENT IDENTIFICATION:   Name:  Hugo Lundberg      MRN:  [id number]     64 y.o.  male             Reason for visit: ZULLY vs ZULLY/CKD    SUBJECTIVE:   Previous notes read and events of last night and this morning noted; prolonged code with eventual return of pulse; appears to be seizing versus posturing at the moment; 100% FiO2; heart rates in the  150-range    OBJECTIVE:  Vitals:    06/20/19 1951 06/20/19 2323 06/21/19 0630 06/21/19 0713   BP: 133/90 145/87 (!) 150/105 143/60   BP Location: Right arm Right arm     Patient Position: Lying Lying     Pulse: 95 99 (!) 130 (!) 150   Resp: 18 18     Temp: 98.3 °F (36.8 °C) 98.1 °F (36.7 °C)     TempSrc: Oral Oral     SpO2: 98% 97%     Weight:       Height:         FiO2 (%): 100 %     Body mass index is 23.64 kg/m².    Intake/Output Summary (Last 24 hours) at 6/21/2019 0824  Last data filed at 6/21/2019 0338  Gross per 24 hour   Intake 3900 ml   Output 8725 ml   Net -4825 ml     Wt Readings from Last 1 Encounters:   06/17/19 2018 79.1 kg (174 lb 4.8 oz)     Wt Readings from Last 3 Encounters:   06/17/19 79.1 kg (174 lb 4.8 oz)   01/17/14 78.9 kg (174 lb 0.2 oz)         Exam:  Intubated; myoclonic jerking  HEENTOral mucosa dry  No eye discharge; no scleral icterus  Neck No JVD  Lungs coarse with rhonchi; breathing appears labored on ventilator  Heart Irregularly irregular, tachycardic, no rub   irrigating Rodas catheter anchored; CBI in progress  Abd distended. No BS  Ext No edema lower ext   No clubbing  No asterixis  Seizure-like movements      Scheduled meds:      pantoprazole 40 mg Intravenous Q12H   piperacillin-tazobactam 3.375 g Intravenous Q8H   sodium chloride 3 mL Intravenous Q12H     IV meds:                          diltiaZEM 5-15 mg/hr Last Rate: 5 mg/hr (06/20/19 1315)   EPINEPHrine 0.02-0.5 mcg/kg/min Last Rate: 0.5 mcg/kg/min (06/21/19 0712)   phenylephrine 0.5-3 mcg/kg/min Last Rate: 2 mcg/kg/min (06/21/19 0651)   sodium chloride 3,000 mL    sodium chloride 125 mL/hr Last Rate: 125 mL/hr (06/20/19 2342)       Data Review:    Results from last 7 days   Lab Units 06/21/19  0324 06/20/19  2355 06/20/19  1750  06/20/19  0549  06/18/19  0548   SODIUM mmol/L 141 139 136   < > 136   < > 135*  135*   POTASSIUM mmol/L 4.9 4.8 4.7   < > 4.0   < > 4.7  4.7   CHLORIDE mmol/L 105 103 101   < > 102   < > 98   98   CO2 mmol/L 23.8 22.7 21.6*   < > 20.5*   < > 22.5  22.5   BUN mg/dL 25* 25* 27*   < > 26*   < > 68*  68*   CREATININE mg/dL 1.11 1.05 1.17   < > 1.08   < > 4.45*  4.45*   CALCIUM mg/dL 8.4* 8.5* 8.8   < > 8.8   < > 9.1  9.1   BILIRUBIN mg/dL 0.6  --   --   --  0.6  --  0.6   ALK PHOS U/L 55  --   --   --  58  --  59   ALT (SGPT) U/L 35  --   --   --  39  --  38   AST (SGOT) U/L 21  --   --   --  24  --  38   GLUCOSE mg/dL 112* 120* 122*   < > 105*   < > 92  92    < > = values in this interval not displayed.     Estimated Creatinine Clearance: 77.2 mL/min (by C-G formula based on SCr of 1.11 mg/dL).  Results from last 7 days   Lab Units 06/18/19  0548   URIC ACID mg/dL 8.2*     Results from last 7 days   Lab Units 06/20/19  0549 06/19/19  0611 06/18/19  0548   MAGNESIUM mg/dL 2.1  --  2.8*   PHOSPHORUS mg/dL 2.0* 2.8 3.8       Results from last 7 days   Lab Units 06/21/19  0324 06/20/19  0549 06/19/19  0611 06/18/19  0548 06/17/19  2231   WBC 10*3/mm3 13.23* 11.46* 11.39* 10.80 11.48*   HEMOGLOBIN g/dL 13.5 14.3 13.3 13.2 13.1   PLATELETS 10*3/mm3 317 377 364 344 341       Results from last 7 days   Lab Units 06/21/19  0745 06/17/19  2231   INR  2.79* 1.24*             ASSESSMENT:     Acute renal failure (ARF) (CMS/HCC)    Acute urinary retention    Large bowel obstruction (CMS/HCC)    Reducible left inguinal hernia    Hyponatremia    Dehydration    Hematuria    1.  ZULLY, non-oliguric, improving and due to obstructive uropathy: Volume status is stable. Hypovolemic hyponatremia, resolved.  k normal; Non gap metabolic acidosis, resolved.    2.  Urinary retention, bladder outlet obstruction.  Rodas, irrigation.   3.  Reducible left inguinal hernia with large bowel obstruction, status post repair yesterday  4.  Cardiopulmonary arrest with suspected neurologic compromise        PLAN:  1.  Epinephrine and Archie-Synephrine  2.  Family en route to determine goals of care  3.  Very poor prognosis    Nestor Stuart  MD [place]  2019    8:24 AM     Electronically signed by Nestor Wong MD at 2019  8:29 AM     Daniel Harris MD at 2019  8:14 AM        GROSS HEME CLOT RETENTION IRRIGATION  POD 1 OPEN LARGE ING HERNIA RPR  NO  PAIN URGE AVSS ABD SOFT WOUND CLEAN IRRIGATION CLEAR C/S NG   CREAT 1.1   TAPER IRRIGATION  NEEDS CONTRASTED CT ( PLAN 5 DAYS AFTER NORMALIZATION OF RENAL FXN )  CYSTO  VOIDING TRIAL      Electronically signed by Daniel Harris MD at 2019  8:17 AM     Juancarlos Dockery MD at 2019  7:57 AM        Postop day 1 open left inguinal hernia repair    Subjective:  Apparently patient did reasonably well with the afternoon and night and then early this morning had a sudden code event.  He was transferred to the CCU and coded again and is currently in very bad shape.    Objective:  Detailed examination not done, but abdomen actually does seem slightly softer today than yesterday  No evidence for recurrence of hernia    Abdominal film from this morning is reviewed.  There is diffuse large bowel distention as expected.    Assessment and plan:  -Large bowel obstruction secondary to incarcerated left inguinal hernia, now status post repair  -Code event, etiology unclear, prognosis at this point very poor  -We will follow    Juancarlos Dockery MD  General and Endoscopic Surgery  [facility]    [facility], [address]  [place], [place], [address]  P: [phone]  F: [phone]        Electronically signed by Juancarlos Dockery MD at 2019  7:59 AM     Adriel Pinto MD at 2019  6:34 AM               LOS: 4 days     Name: Hugo Lundberg  Age/Sex: 64 y.o. male  :  1955        PCP: Edison Garland MD  No chief complaint on file.     Subjective   POD1 for open left inguinal hernia repair, did well immediately postoperatively.  Per family was talking and awake but a little confused.  Early this AM had a cardiac arrest with loss of pulse,  prior to the event he was in rate controlled fib/flutter.  He was resuscitated and transferred to the ICU.        pantoprazole 40 mg Intravenous Q12H   piperacillin-tazobactam 3.375 g Intravenous Q8H   sodium chloride 3 mL Intravenous Q12H       diltiaZEM 5-15 mg/hr Last Rate: 5 mg/hr (06/20/19 1315)   sodium chloride 3,000 mL    sodium chloride 125 mL/hr Last Rate: 125 mL/hr (06/20/19 2342)       Objective   Vital Signs  Temp:  [98 °F (36.7 °C)-98.9 °F (37.2 °C)] 98.1 °F (36.7 °C)  Heart Rate:  [] 99  Resp:  [18-20] 18  BP: (119-146)/(79-96) 145/87  Body mass index is 23.64 kg/m².    Intake/Output Summary (Last 24 hours) at 6/21/2019 0634  Last data filed at 6/21/2019 0338  Gross per 24 hour   Intake 6900 ml   Output 72945 ml   Net -6825 ml       Physical Exam   Constitutional: He appears distressed.   Nursing note and vitals reviewed.  Patient not examined actively coding      Results Review:       I reviewed the patient's new clinical results.  Results from last 7 days   Lab Units 06/21/19  0324 06/20/19  0549 06/19/19  0611 06/18/19  0548 06/17/19  2231   WBC 10*3/mm3 13.23* 11.46* 11.39* 10.80 11.48*   HEMOGLOBIN g/dL 13.5 14.3 13.3 13.2 13.1   PLATELETS 10*3/mm3 317 377 364 344 341     Results from last 7 days   Lab Units 06/21/19  0324 06/20/19  2355 06/20/19  1750 06/20/19  1204 06/20/19  0549 06/20/19  0009 06/19/19  1755  06/19/19  0611  06/18/19  0548   SODIUM mmol/L 141 139 136 136 136 134* 139   < > 138   < > 135*  135*   POTASSIUM mmol/L 4.9 4.8 4.7 4.3 4.0 4.1 4.4   < > 4.5   < > 4.7  4.7   CHLORIDE mmol/L 105 103 101 100 102 101 105   < > 104   < > 98  98   CO2 mmol/L 23.8 22.7 21.6* 19.4* 20.5* 20.5* 21.2*   < > 20.2*   < > 22.5  22.5   BUN mg/dL 25* 25* 27* 26* 26* 26* 27*   < > 34*   < > 68*  68*   CREATININE mg/dL 1.11 1.05 1.17 1.10 1.08 1.16 1.20   < > 1.53*   < > 4.45*  4.45*   CALCIUM mg/dL 8.4* 8.5* 8.8 8.8 8.8 8.7 8.9   < > 9.0   < > 9.1  9.1   MAGNESIUM mg/dL  --   --   --    --  2.1  --   --   --   --   --  2.8*   PHOSPHORUS mg/dL  --   --   --   --  2.0*  --   --   --  2.8  --  3.8    < > = values in this interval not displayed.   Estimated Creatinine Clearance: 77.2 mL/min (by C-G formula based on SCr of 1.11 mg/dL).  Results from last 7 days   Lab Units 06/18/19  0113   NITRITE UA  Positive*   WBC UA /HPF 6-12*   BACTERIA UA /HPF None Seen   SQUAM EPITHEL UA /HPF 0-2   URINECX  No growth     Assessment/Plan     Acute renal failure (ARF) (CMS/HCC)    Acute urinary retention    Large bowel obstruction (CMS/HCC)    Reducible left inguinal hernia    Hyponatremia    Dehydration    Hematuria      PLAN  -Appreciate nephrology, general surgery, and urology's assistance  -POD 1 open hernia repair   -sp cardiac arrest this AM, Afib/Aflutter prior to surgery, rate controlled on and off diltiazem, echo prior to surgery with normal EF and borderline hypertrophy, ask cardiology to weigh in.  -CBI per urology  -Renal function is improving and back to baseline  -Sodium has stabilized and is within normal limits this morning.  -Continue Zosyn for the time being urine culture remains negative  -Creatinine is returned to baseline  -Family called and updated regarding transfer and cardiac arrest    Disposition  To be determined but likely here for a bit with plans for open inguinal hernia repair      Adriel Pinto MD  [facility]  06/21/19  6:34 AM    6:56 AM  Called by Dr Perdue and he has lost a pulse 2 more times and they are actively resuscitating again.  Called and spoke with Ms Pratt again and she wishes to continue to actively resuscitate.  Prognosis is much poorer at this point with continued cardiac compromise.          Electronically signed by Adriel Pinto MD at 6/21/2019  7:02 AM            Consult Notes      Gerald Man MD at 6/21/2019  7:28 AM          Group: [facility]         CONSULT NOTE    Patient Identification:    Hugo ZEPEDA  Toño  64 y.o.  male  1955  [id number]            Patient Care Team:  Edison Garland MD as PCP - General (Family Medicine)    Requesting physician: Dr. Pinto    Reason for Consultation: Cardiac arrest, critical care management    History of Present Illness: Patient is a pleasant 64-year-old white male who with no significant past medical history who was admitted to the hospital on the 17th of this month for abdominal discomfort and bloating along with difficulty urination diagnosed with acute renal failure, bilateral hydronephrosis and distended bladder thought to be from prostate obstruction and underwent TURP.  Patient also noted to have incarcerated inguinal hernia and was taken for open left inguinal hernia repair yesterday night was noted to have perioperative atrial flutter and was on and off on Cardizem drip.  Patient unfortunately had PEA cardiac arrest early this a.m. requiring intubation mechanical ventilation.  He had 2 further PEA arrests while in ICU requiring CPR.  ROSC was obtained finally this a.m.  Discussed with the overnight intensivist about the events.  More than an hour of CPR documented with significant concerns of long-term recovery.  Niece was updated but wanted us to keep continuing with aggressive resuscitative effort.    During my evaluation patient is unresponsive while on the mechanical ventilator on 2 pressor support with tachycardia and appropriate blood pressures.  Abdomen is distended.  Stat KUB and chest x-ray obtained.  Labs reviewed which does not show any obvious etiology.  Will repeat labs.    Objective     Review of Systems:  Unable to obtain due to critical illness    Past Medical History:  Past Medical History:   Diagnosis Date   • Inguinal hernia        Past Surgical History:  History reviewed. No pertinent surgical history.     Home Meds:  No medications prior to admission.       Allergies:  No Known Allergies    Social History:   Social History  "    Socioeconomic History   • Marital status: Single     Spouse name: Not on file   • Number of children: Not on file   • Years of education: Not on file   • Highest education level: Not on file   Tobacco Use   • Smoking status: Never Smoker   • Smokeless tobacco: Never Used   Substance and Sexual Activity   • Alcohol use: No     Frequency: Never       Family History:  History reviewed. No pertinent family history.    Physical Exam:  BP (!) 150/105   Pulse (!) 130   Temp 98.1 °F (36.7 °C) (Oral)   Resp 18   Ht 182.9 cm (72\")   Wt 79.1 kg (174 lb 4.8 oz)   SpO2 97%   BMI 23.64 kg/m²   Body mass index is 23.64 kg/m². 97% 79.1 kg (174 lb 4.8 oz)    Physical Exam     CONSTITUTIONAL:   · Well developed  · Well nourished  · Unresponsive on the mechanical ventilator    EYES:    · SOHEILA  -sluggish                                                                       · Conjunctiva normal  · Sclera non-icteric.    HENT:   · Atraumatic, normocephalic  · External ears and nose normal  · ETT+    NECK:  · Thyroid not enlarged  · Trachea midline    RESPIRATORY:    · + Accessory Muscle Use  · Normal breath sounds heard bilaterally  · No rales. No wheezing  · No dullness  · Mechanical sounds from Rib fracture    CARDIOVASCULAR:    · RRR, tachy   · No murmur  · Lower extremity edema: None    GI:   · Abdomen soft ,  + distended  · Absent BS.   · No palpable hepatospleenomegaly    MUSCULOSKELETAL:  · Unresponsive  · no obvious deformities  · No clubbing or cyanosis     SKIN:    · No visible rashes  · No palpable nodules    PSYCHIATRIC:  · Unable to obtain    Lab Review:   LABS:  Lab Results   Component Value Date    CALCIUM 8.4 (L) 06/21/2019    PHOS 2.0 (L) 06/20/2019     Results from last 7 days   Lab Units 06/21/19  0324 06/20/19  2355 06/20/19  1750  06/20/19  0549  06/19/19  0611  06/18/19  0548  06/17/19  2231   MAGNESIUM mg/dL  --   --   --   --  2.1  --   --   --  2.8*  --   --    SODIUM mmol/L 141 139 136   < > 136   < > " 138   < > 135*  135*   < > 133*   POTASSIUM mmol/L 4.9 4.8 4.7   < > 4.0   < > 4.5   < > 4.7  4.7   < > 4.5   CHLORIDE mmol/L 105 103 101   < > 102   < > 104   < > 98  98   < > 96*   CO2 mmol/L 23.8 22.7 21.6*   < > 20.5*   < > 20.2*   < > 22.5  22.5   < > 18.0*   BUN mg/dL 25* 25* 27*   < > 26*   < > 34*   < > 68*  68*   < > 84*   CREATININE mg/dL 1.11 1.05 1.17   < > 1.08   < > 1.53*   < > 4.45*  4.45*   < > 6.08*   GLUCOSE mg/dL 112* 120* 122*   < > 105*   < > 128*   < > 92  92   < > 91   CALCIUM mg/dL 8.4* 8.5* 8.8   < > 8.8   < > 9.0   < > 9.1  9.1   < > 9.4   WBC 10*3/mm3 13.23*  --   --   --  11.46*  --  11.39*  --  10.80  --  11.48*   HEMOGLOBIN g/dL 13.5  --   --   --  14.3  --  13.3  --  13.2  --  13.1   PLATELETS 10*3/mm3 317  --   --   --  377  --  364  --  344  --  341   ALT (SGPT) U/L 35  --   --   --  39  --   --   --  38  --  39   AST (SGOT) U/L 21  --   --   --  24  --   --   --  38  --  44*   PROCALCITONIN ng/mL  --   --   --   --   --   --   --   --   --   --  1.04*    < > = values in this interval not displayed.     No results found for: CKTOTAL, CKMB, CKMBINDEX, TROPONINI, TROPONINT  Results from last 7 days   Lab Units 06/18/19  0113   URINECX  No growth     Results from last 7 days   Lab Units 06/18/19  1512 06/18/19  0548 06/17/19  2232 06/17/19  2231   PROCALCITONIN ng/mL  --   --   --  1.04*   LACTATE mmol/L 0.9 0.8 0.8  --      Results from last 7 days   Lab Units 06/17/19  2231   INR  1.24*     Results from last 7 days   Lab Units 06/18/19  0113   NITRITE UA  Positive*   WBC UA /HPF 6-12*   BACTERIA UA /HPF None Seen   SQUAM EPITHEL UA /HPF 0-2   URINECX  No growth             Imaging: I personally visualized the images of chest scans/ x-rays performed within last 3 days and summarized below.    Assessment   Resuscitated PEA arrest x3  Shock - Cardiogenic versus other  Acute respiratory failure s/p vent 6/21  A. fib RVR  Incarcerated left inguinal hernia status post open  repair  BPH status post TURP  Acute renal failure  Acute obstructive uropathy/ hydronephrosis    RECOMMENDATIONS:  At this point it is not clearly apparent reason of the PEA arrest.  Patient had prolonged resuscitation more than an hour and Dr. Perdue discussed with the niece about probable need to hold CPR but she wanted to come in to see him.  ROSC obtained by patient on high-dose pressors and unresponsive with severe acidosis unable to be reversed in spite of significant mechanical ventilator support.  I have discussed with the consultants on the case including the surgeon as well as nephrology service who do not find an obvious etiology for the cardiac arrest.  KUB shows colonic distention which is nonspecific and electrolytes are within normal limits or unable to explain PEA/ Cardiac arrest  We will talk to the family on arrival to see how much they would like to keep continuing given the extremely poor prognosis from more than 60 minutes of resuscitation.  We will offer central line and further testing based on the discussion with the family.  Pulmonary embolism is a consideration if there is no acute issues noted on the chest x-ray as well as KUB.  Unfortunately doubt if any aggressive treatment for this as well would change outcome.  Code status to be determined     Addendum - After discussion with the family and her discussion with Dr. Perdue and they decided to withdraw life support as they understand the implication of long-term resuscitative efforts.  I have discussed with them about the events.  I told him that I do not have a good reason why he had a cardiac arrest and obviously the family have several questions regarding the cause of death and a want to consider autopsy.  I have respected their wishes and will continue with appropriate paperwork.    CC - 80 mins    Thank you for letting me participate in the care of this pleasant patient.  I have discussed my findings and recommendations with family,  nursing staff and consulting provider.     Gerald Man MD  6/21/2019  7:28 AM      Electronically signed by Gerald Man MD at 6/21/2019  8:32 AM               Steven Perdue MD   Physician   Pulmonology   Code Documentation   Signed   Date of Service:  6/21/2019  7:13 AM   Creation Time:  6/21/2019  7:13 AM            Signed                 Unfortunately upon arrival in the CCU patient was again in PEA rhythm.  We were unable to achieve ROSC even after 20 minutes of CPR.  I spoke the patient's niece and explained the situation.  We have now been doing CPR almost continuously for over 65 minutes.  She requests we keep the patient alive until she gets there which can be another half an hour.  I explained that this was not physically possible.  We did achieve ROSC after 3 bolus therapies.  Patient has been started on an epinephrine drip.  Extremely poor prognosis.                      Steven Perdue MD   Physician   Pulmonology   Code Documentation   Signed   Date of Service:  6/21/2019  6:44 AM   Creation Time:  6/21/2019  6:44 AM            Signed                 After successful ROSC, patient was intubated and prepared for transport to CCU. However he coded again before they could initiate transport.  When I arrived CPR in progress. Given epi and atropine with total 3 rounds of epi and 1 bicarb. ROSC achieved.  Fluids going in. Will call for Archie drip.                        Steven Perdue MD   Physician   Pulmonology   Code Documentation   Signed   Date of Service:  6/21/2019  6:34 AM   Creation Time:  6/21/2019  6:34 AM            Signed                 Overhead code blue called. On arrival CPR in progress. Given 1 round of epinephrine and atropine. K was 4.9 therefore given 1 amp calcium. ROSC achieved.                      Idalia Godfrey, RN   Registered Nurse      Nursing Note   Signed   Date of Service:  6/21/2019  6:20 AM   Creation Time:  6/21/2019  8:42 AM            Signed                  Patient resting comfortably at 0600 rounds. Patient called out at 0620 complaining of shortness of air. This RN arrived in the patient's room and found patient's HR to be in the 130's and O2 sat 75%. Rapid response called.  Code blue called shortly after.                        Idalia Godfrey, RN   Registered Nurse      Plan of Care   Signed   Date of Service:  6/21/2019  5:33 AM   Creation Time:  6/21/2019  5:33 AM            Signed           Problem: Patient Care Overview  Goal: Plan of Care Review  Outcome: Ongoing (interventions implemented as appropriate)    06/21/19 0531   Coping/Psychosocial   Plan of Care Reviewed With patient   OTHER   Outcome Summary pt is alert and oriented, 2L o2 nasal cannula, no falls, bed alarm on, IV fluids/antibiotic, cardizem gtt continued, bladder irrigation continued, NG tube in place, not much output, surgical dressing C/D/I, continue to monitor

## 2019-06-21 NOTE — PAYOR COMM NOTE
"DISCHARGED.  .        Hugo Lundberg (Dcsd. Male)     Date of Birth Social Security Number Address Home Phone MRN    1955  [id number] [id number] [phone] [id number]    [employer] Marital Status          None Single       Admission Date Admission Type Admitting Provider Attending Provider Department, Room/Bed    19 Urgent Roberto Patel MD  [facility] CORONARY [facility], [address]/1    Discharge Date Discharge Disposition Discharge Destination        2019               Attending Provider:  (none)   Allergies:  No Known Allergies    Isolation:  None   Infection:  None   Code Status:  CPR    Ht:  182.9 cm (72\")   Wt:  79.1 kg (174 lb 4.8 oz)    Admission Cmt:  None   Principal Problem:  Acute renal failure (ARF) (CMS/[facility]) [N17.9]                 Active Insurance as of 2019     Primary Coverage     Payor Plan Insurance Group Employer/Plan Group    [employer]AN PASSPORT MEDICAID     Payor Plan Address Payor Plan Phone Number Payor Plan Fax Number Effective Dates    [address] [phone]  2019 - None Entered    [facility] [phone]       Subscriber Name Subscriber Birth Date Member ID       HUGO LUNDBERG 1955 [id number]                 Emergency Contacts      (Rel.) Home Phone Work Phone Mobile Phone    AL BUNCH (Sister) [phone] -- --    Karla Pratt (Other) -- -- [phone]            Discharge Summary     No notes of this type exist for this encounter.        "

## 2019-06-21 NOTE — CODE DOCUMENTATION
Unfortunately upon arrival in the CCU patient was again in PEA rhythm.  We were unable to achieve ROSC even after 20 minutes of CPR.  I spoke the patient's niece and explained the situation.  We have now been doing CPR almost continuously for over 65 minutes.  She requests we keep the patient alive until she gets there which can be another half an hour.  I explained that this was not physically possible.  We did achieve ROSC after 3 bolus therapies.  Patient has been started on an epinephrine drip.  Extremely poor prognosis.

## 2019-06-21 NOTE — PROGRESS NOTES
NEPHROLOGY PROGRESS NOTE    PATIENT IDENTIFICATION:   Name:  Hugo Lundberg      MRN:  [id number]     64 y.o.  male             Reason for visit: ZULLY vs ZULLY/CKD    SUBJECTIVE:   Previous notes read and events of last night and this morning noted; prolonged code with eventual return of pulse; appears to be seizing versus posturing at the moment; 100% FiO2; heart rates in the 150-range    OBJECTIVE:  Vitals:    06/20/19 1951 06/20/19 2323 06/21/19 0630 06/21/19 0713   BP: 133/90 145/87 (!) 150/105 143/60   BP Location: Right arm Right arm     Patient Position: Lying Lying     Pulse: 95 99 (!) 130 (!) 150   Resp: 18 18     Temp: 98.3 °F (36.8 °C) 98.1 °F (36.7 °C)     TempSrc: Oral Oral     SpO2: 98% 97%     Weight:       Height:         FiO2 (%): 100 %     Body mass index is 23.64 kg/m².    Intake/Output Summary (Last 24 hours) at 6/21/2019 0824  Last data filed at 6/21/2019 0338  Gross per 24 hour   Intake 3900 ml   Output 8725 ml   Net -4825 ml     Wt Readings from Last 1 Encounters:   06/17/19 2018 79.1 kg (174 lb 4.8 oz)     Wt Readings from Last 3 Encounters:   06/17/19 79.1 kg (174 lb 4.8 oz)   01/17/14 78.9 kg (174 lb 0.2 oz)         Exam:  Intubated; myoclonic jerking  HEENTOral mucosa dry  No eye discharge; no scleral icterus  Neck No JVD  Lungs coarse with rhonchi; breathing appears labored on ventilator  Heart Irregularly irregular, tachycardic, no rub   irrigating Rodas catheter anchored; CBI in progress  Abd distended. No BS  Ext No edema lower ext   No clubbing  No asterixis  Seizure-like movements      Scheduled meds:      pantoprazole 40 mg Intravenous Q12H   piperacillin-tazobactam 3.375 g Intravenous Q8H   sodium chloride 3 mL Intravenous Q12H     IV meds:                          diltiaZEM 5-15 mg/hr Last Rate: 5 mg/hr (06/20/19 1315)   EPINEPHrine 0.02-0.5 mcg/kg/min Last Rate: 0.5 mcg/kg/min (06/21/19 0712)   phenylephrine 0.5-3 mcg/kg/min Last Rate: 2 mcg/kg/min (06/21/19 0651)   sodium  chloride 3,000 mL    sodium chloride 125 mL/hr Last Rate: 125 mL/hr (06/20/19 2342)       Data Review:    Results from last 7 days   Lab Units 06/21/19  0324 06/20/19  2355 06/20/19  1750  06/20/19  0549  06/18/19  0548   SODIUM mmol/L 141 139 136   < > 136   < > 135*  135*   POTASSIUM mmol/L 4.9 4.8 4.7   < > 4.0   < > 4.7  4.7   CHLORIDE mmol/L 105 103 101   < > 102   < > 98  98   CO2 mmol/L 23.8 22.7 21.6*   < > 20.5*   < > 22.5  22.5   BUN mg/dL 25* 25* 27*   < > 26*   < > 68*  68*   CREATININE mg/dL 1.11 1.05 1.17   < > 1.08   < > 4.45*  4.45*   CALCIUM mg/dL 8.4* 8.5* 8.8   < > 8.8   < > 9.1  9.1   BILIRUBIN mg/dL 0.6  --   --   --  0.6  --  0.6   ALK PHOS U/L 55  --   --   --  58  --  59   ALT (SGPT) U/L 35  --   --   --  39  --  38   AST (SGOT) U/L 21  --   --   --  24  --  38   GLUCOSE mg/dL 112* 120* 122*   < > 105*   < > 92  92    < > = values in this interval not displayed.     Estimated Creatinine Clearance: 77.2 mL/min (by C-G formula based on SCr of 1.11 mg/dL).  Results from last 7 days   Lab Units 06/18/19  0548   URIC ACID mg/dL 8.2*     Results from last 7 days   Lab Units 06/20/19  0549 06/19/19  0611 06/18/19  0548   MAGNESIUM mg/dL 2.1  --  2.8*   PHOSPHORUS mg/dL 2.0* 2.8 3.8       Results from last 7 days   Lab Units 06/21/19  0324 06/20/19  0549 06/19/19  0611 06/18/19  0548 06/17/19  2231   WBC 10*3/mm3 13.23* 11.46* 11.39* 10.80 11.48*   HEMOGLOBIN g/dL 13.5 14.3 13.3 13.2 13.1   PLATELETS 10*3/mm3 317 377 364 344 341       Results from last 7 days   Lab Units 06/21/19  0745 06/17/19  2231   INR  2.79* 1.24*             ASSESSMENT:     Acute renal failure (ARF) (CMS/HCC)    Acute urinary retention    Large bowel obstruction (CMS/HCC)    Reducible left inguinal hernia    Hyponatremia    Dehydration    Hematuria    1.  ZULLY, non-oliguric, improving and due to obstructive uropathy: Volume status is stable. Hypovolemic hyponatremia, resolved.  k normal; Non gap metabolic acidosis,  resolved.    2.  Urinary retention, bladder outlet obstruction.  Rodas, irrigation.   3.  Reducible left inguinal hernia with large bowel obstruction, status post repair yesterday  4.  Cardiopulmonary arrest with suspected neurologic compromise        PLAN:  1.  Epinephrine and Archie-Synephrine  2.  Family en route to determine goals of care  3.  Very poor prognosis    Nestor Wong MD  6/21/2019    8:24 AM

## 2019-06-21 NOTE — DISCHARGE SUMMARY
Discharge As      Date of Admisssion:  2019  Date of Death:  2019  Time of Death:  0810    Patient Care Team:  Edison Garland MD as PCP - General (Family Medicine)    Final Diagnosis:   Massive pulmonary embolus  Acute hypoxic respiratory failure  Atrial Fibrillation/Atrial Flutter  Acute Renal Failure  Large indirect Inguinal Hernia  Bowel obstruction  Urinary retention  Hematuria    Presenting Problem/History of Present Illness  Acute renal failure (ARF) (CMS/[facility]) [N17.9]      Hospital Course  Patient was a 64 y.o. male presented with Large bowel obstruction and Acute renal failure.  He initially presented to an outside emergency room where a CAT scan of the abdomen and pelvis showed a possible large bowel obstruction.  He was transferred urgently to our facility for surgical evaluation.  He was found to have a large inguinal hernia.  He had bowel within the hernia sac that was easily reducible.  He was also noted to have hematuria and urology was consulted and he was started on a continuous bladder irrigation.  More concerning was his acute renal failure on presentation.  His serum creatinine on admission was 8.9 with a low serum sodium of 118.  A Rodas catheter was placed which returned 2 L of bloody urine.  He was started on IV antibiotics over concern for possible underlying infection however given the fact that this was such a loaded field with red blood cells it was difficult to interpret.  His urine culture ultimately returned negative for infection but antibiotics were continued given the elevation in his white blood cell count.  Again he was seen and evaluated by general surgery urology and nephrology all within 24 hours of admission to the hospital.  Surgery recommended conservative management.  The hernia was easily reduced and they wanted to watch his abdominal exam closely.  They did not see any obstructive physiology and his bowels were moving and he was placed on a diet.   Given the hematuria unfortunately he cannot be placed on pharmacologic DVT prophylaxis.  The second hospitalist day he was somewhat tachycardic but normotensive.  His heart rate was somewhat irregular.  EKG reviewed did show atrial fibrillation.  An echocardiogram was done and he was started on a Cardizem drip for rate control.  His blood pressure remained stable throughout the entire hospitalization.  He could not be anticoagulated given the current hematuria and the plan for abdominal surgery.  By the third hospital day his urine had cleared and the continuous bladder irrigation was slowed.  His urine culture had returned negative his white blood cell count had been improving.  He is been afebrile.  His renal function is also nearly returned to baseline.  Despite improvement in the hematuria and other physiologic parameters his abdominal exam continued to worsen.  He became more bloated and started to have some dyspeptic symptoms.  His x-ray showed no improvement in the bowel gas pattern noted initially on CT scan.  I discussed his case with the surgeons daily and ultimately was decided to take the patient for repair of this hernia in hopes of decompressing the bowel and improving his distention.  The plan was discussed with the patient as well as his power of  at the bedside.  While there was some concern regarding the surgery ultimately all decided to proceed with surgical intervention.  The hernia was dissected and reduced.  The bowel noted during the surgery was found to be quite viable and healthy but was obviously distended.  Ultimately the hernia was repaired and the abdomen was closed.  Following surgery he was awake talking and was doing well.  Per nursing staff he did well overnight.  Early in the hours of June 21 he began to feel very anxious and nervous.  He developed acute respiratory failure and PEA arrest.  Likely I was in the hospital and was present for the initial cardiac arrest.  I  assisted the pulmonary critical care physician with history and current issues.  He did have spontaneous return of circulation during the initial code event but during attempts to transfer him to the CCU he had another PEA arrest.  He underwent further resuscitation and had 2 other code events.  I discussed the issues and current events with his power of  over the phone both during the initial code event and the second code event.  I was hopeful after the first code event that he may have been resuscitated enough to continue aggressive measures in the ICU however after the second and third code events it became clear that his overall prognosis was grim.  He was placed on IV pressors and kept in the ICU until his family arrived and ultimately they elected to withdraw life-prolonging therapy.  I felt it was probable that he had a pulmonary embolus led to his code event and ultimate demise.  The family did wish to pursue an autopsy he was taken to pathology and an autopsy was performed on his day of death.  He was found to have massive pulmonary embolus within both the pulmonary arteries.  I did discuss other issues with the pathologist over the phone.  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  It was not acute.  He had some questions about whether or not he might of had a hypercoagulable state or condition.  The bladder was examined and did show significant inflammation but no definitive masses or cancers.  Further pathology was sent.  At the family's request I did call and share this information with them over the phone.  I answered all her questions and concerns regarding his hospitalization and pathology findings at the time of the phone call.  They acknowledged understanding.  Again ultimately this gentleman passed away on June 21 at 8 10 in the morning.    Adriel Pinto MD  06/21/19  9:34 AM

## 2019-06-21 NOTE — PROGRESS NOTES
Postop day 1 open left inguinal hernia repair    Subjective:  Apparently patient did reasonably well with the afternoon and night and then early this morning had a sudden code event.  He was transferred to the CCU and coded again and is currently in very bad shape.    Objective:  Detailed examination not done, but abdomen actually does seem slightly softer today than yesterday  No evidence for recurrence of hernia    Abdominal film from this morning is reviewed.  There is diffuse large bowel distention as expected.    Assessment and plan:  -Large bowel obstruction secondary to incarcerated left inguinal hernia, now status post repair  -Code event, etiology unclear, prognosis at this point very poor  -We will follow    Juancarlos Dockery MD  General and Endoscopic Surgery  [facility]    [address], [address]  [place], [place], [address]  P: [phone]  F: [phone]

## 2019-06-21 NOTE — PROCEDURES
Indication: respiratory failure    After sedation with propofol, cords were directly visualized with Aixa 4. Thereafter endotracheal tube size 8 was placed through the cords. Position was confirmed with bilateral air entry and change of color on capnometer. There were no complications of procedure.    Ventilator settings given to respiratory therapist. Stat portable chest x-ray has been requested and will be reviewed.

## 2019-06-21 NOTE — PROGRESS NOTES
GROSS HEME CLOT RETENTION IRRIGATION  POD 1 OPEN LARGE ING HERNIA RPR  NO  PAIN URGE AVSS ABD SOFT WOUND CLEAN IRRIGATION CLEAR C/S NG   CREAT 1.1   TAPER IRRIGATION  NEEDS CONTRASTED CT ( PLAN 5 DAYS AFTER NORMALIZATION OF RENAL FXN )  CYSTO  VOIDING TRIAL

## 2019-06-21 NOTE — PROGRESS NOTES
LOS: 4 days     Name: Hugo Lundberg  Age/Sex: 64 y.o. male  :  1955        PCP: Edison Garland MD  No chief complaint on file.     Subjective   POD1 for open left inguinal hernia repair, did well immediately postoperatively.  Per family was talking and awake but a little confused.  Early this AM had a cardiac arrest with loss of pulse, prior to the event he was in rate controlled fib/flutter.  He was resuscitated and transferred to the ICU.        pantoprazole 40 mg Intravenous Q12H   piperacillin-tazobactam 3.375 g Intravenous Q8H   sodium chloride 3 mL Intravenous Q12H       diltiaZEM 5-15 mg/hr Last Rate: 5 mg/hr (19 1315)   sodium chloride 3,000 mL    sodium chloride 125 mL/hr Last Rate: 125 mL/hr (19 2342)       Objective   Vital Signs  Temp:  [98 °F (36.7 °C)-98.9 °F (37.2 °C)] 98.1 °F (36.7 °C)  Heart Rate:  [] 99  Resp:  [18-20] 18  BP: (119-146)/(79-96) 145/87  Body mass index is 23.64 kg/m².    Intake/Output Summary (Last 24 hours) at 2019 0634  Last data filed at 2019 0338  Gross per 24 hour   Intake 6900 ml   Output 27288 ml   Net -6825 ml       Physical Exam   Constitutional: He appears distressed.   Nursing note and vitals reviewed.  Patient not examined actively coding      Results Review:       I reviewed the patient's new clinical results.  Results from last 7 days   Lab Units 19  0324 19  0549 19  0611 19  0548 19  2231   WBC 10*3/mm3 13.23* 11.46* 11.39* 10.80 11.48*   HEMOGLOBIN g/dL 13.5 14.3 13.3 13.2 13.1   PLATELETS 10*3/mm3 317 377 364 344 341     Results from last 7 days   Lab Units 19  0324 19  2355 19  1750 19  1204 19  0549 19  0009 19  1755  19  0611  19  0548   SODIUM mmol/L 141 139 136 136 136 134* 139   < > 138   < > 135*  135*   POTASSIUM mmol/L 4.9 4.8 4.7 4.3 4.0 4.1 4.4   < > 4.5   < > 4.7  4.7   CHLORIDE mmol/L 105 103 101 100 102 101 105   <  > 104   < > 98  98   CO2 mmol/L 23.8 22.7 21.6* 19.4* 20.5* 20.5* 21.2*   < > 20.2*   < > 22.5  22.5   BUN mg/dL 25* 25* 27* 26* 26* 26* 27*   < > 34*   < > 68*  68*   CREATININE mg/dL 1.11 1.05 1.17 1.10 1.08 1.16 1.20   < > 1.53*   < > 4.45*  4.45*   CALCIUM mg/dL 8.4* 8.5* 8.8 8.8 8.8 8.7 8.9   < > 9.0   < > 9.1  9.1   MAGNESIUM mg/dL  --   --   --   --  2.1  --   --   --   --   --  2.8*   PHOSPHORUS mg/dL  --   --   --   --  2.0*  --   --   --  2.8  --  3.8    < > = values in this interval not displayed.   Estimated Creatinine Clearance: 77.2 mL/min (by C-G formula based on SCr of 1.11 mg/dL).  Results from last 7 days   Lab Units 06/18/19  0113   NITRITE UA  Positive*   WBC UA /HPF 6-12*   BACTERIA UA /HPF None Seen   SQUAM EPITHEL UA /HPF 0-2   URINECX  No growth     Assessment/Plan     Acute renal failure (ARF) (CMS/HCC)    Acute urinary retention    Large bowel obstruction (CMS/HCC)    Reducible left inguinal hernia    Hyponatremia    Dehydration    Hematuria      PLAN  -Appreciate nephrology, general surgery, and urology's assistance  -POD 1 open hernia repair   -sp cardiac arrest this AM, Afib/Aflutter prior to surgery, rate controlled on and off diltiazem, echo prior to surgery with normal EF and borderline hypertrophy, ask cardiology to weigh in.  -CBI per urology  -Renal function is improving and back to baseline  -Sodium has stabilized and is within normal limits this morning.  -Continue Zosyn for the time being urine culture remains negative  -Creatinine is returned to baseline  -Family called and updated regarding transfer and cardiac arrest    Disposition  To be determined but likely here for a bit with plans for open inguinal hernia repair      Adriel Pinto MD  [facility]  06/21/19  6:34 AM    6:56 AM  Called by Dr Perdue and he has lost a pulse 2 more times and they are actively resuscitating again.  Called and spoke with Ms Pratt again and she wishes to continue  to actively resuscitate.  Prognosis is much poorer at this point with continued cardiac compromise.

## 2019-06-21 NOTE — CONSULTS
Group: [facility]         CONSULT NOTE    Patient Identification:    Hugo Lundberg  64 y.o.  male  1955  [id number]            Patient Care Team:  Edison Garland MD as PCP - General (Family Medicine)    Requesting physician: Dr. Pinto    Reason for Consultation: Cardiac arrest, critical care management    History of Present Illness: Patient is a pleasant 64-year-old white male who with no significant past medical history who was admitted to the hospital on the 17th of this month for abdominal discomfort and bloating along with difficulty urination diagnosed with acute renal failure, bilateral hydronephrosis and distended bladder thought to be from prostate obstruction and underwent TURP.  Patient also noted to have incarcerated inguinal hernia and was taken for open left inguinal hernia repair yesterday night was noted to have perioperative atrial flutter and was on and off on Cardizem drip.  Patient unfortunately had PEA cardiac arrest early this a.m. requiring intubation mechanical ventilation.  He had 2 further PEA arrests while in ICU requiring CPR.  ROSC was obtained finally this a.m.  Discussed with the overnight intensivist about the events.  More than an hour of CPR documented with significant concerns of long-term recovery.  Niece was updated but wanted us to keep continuing with aggressive resuscitative effort.    During my evaluation patient is unresponsive while on the mechanical ventilator on 2 pressor support with tachycardia and appropriate blood pressures.  Abdomen is distended.  Stat KUB and chest x-ray obtained.  Labs reviewed which does not show any obvious etiology.  Will repeat labs.    Objective     Review of Systems:  Unable to obtain due to critical illness    Past Medical History:  Past Medical History:   Diagnosis Date   • Inguinal hernia        Past Surgical History:  History reviewed. No pertinent surgical history.     Home Meds:  No medications prior to  "admission.       Allergies:  No Known Allergies    Social History:   Social History     Socioeconomic History   • Marital status: Single     Spouse name: Not on file   • Number of children: Not on file   • Years of education: Not on file   • Highest education level: Not on file   Tobacco Use   • Smoking status: Never Smoker   • Smokeless tobacco: Never Used   Substance and Sexual Activity   • Alcohol use: No     Frequency: Never       Family History:  History reviewed. No pertinent family history.    Physical Exam:  BP (!) 150/105   Pulse (!) 130   Temp 98.1 °F (36.7 °C) (Oral)   Resp 18   Ht 182.9 cm (72\")   Wt 79.1 kg (174 lb 4.8 oz)   SpO2 97%   BMI 23.64 kg/m²  Body mass index is 23.64 kg/m². 97% 79.1 kg (174 lb 4.8 oz)    Physical Exam     CONSTITUTIONAL:   · Well developed  · Well nourished  · Unresponsive on the mechanical ventilator    EYES:    · SOHEILA  -sluggish                                                                       · Conjunctiva normal  · Sclera non-icteric.    HENT:   · Atraumatic, normocephalic  · External ears and nose normal  · ETT+    NECK:  · Thyroid not enlarged  · Trachea midline    RESPIRATORY:    · + Accessory Muscle Use  · Normal breath sounds heard bilaterally  · No rales. No wheezing  · No dullness  · Mechanical sounds from Rib fracture    CARDIOVASCULAR:    · RRR, tachy   · No murmur  · Lower extremity edema: None    GI:   · Abdomen soft , + distended  · Absent BS.   · No palpable hepatospleenomegaly    MUSCULOSKELETAL:  · Unresponsive  · no obvious deformities  · No clubbing or cyanosis     SKIN:    · No visible rashes  · No palpable nodules    PSYCHIATRIC:  · Unable to obtain    Lab Review:   LABS:  Lab Results   Component Value Date    CALCIUM 8.4 (L) 06/21/2019    PHOS 2.0 (L) 06/20/2019     Results from last 7 days   Lab Units 06/21/19  0324 06/20/19  2355 06/20/19  1750  06/20/19  0549  06/19/19  0611  06/18/19  0548  06/17/19  2231   MAGNESIUM mg/dL  --   --   --   " --  2.1  --   --   --  2.8*  --   --    SODIUM mmol/L 141 139 136   < > 136   < > 138   < > 135*  135*   < > 133*   POTASSIUM mmol/L 4.9 4.8 4.7   < > 4.0   < > 4.5   < > 4.7  4.7   < > 4.5   CHLORIDE mmol/L 105 103 101   < > 102   < > 104   < > 98  98   < > 96*   CO2 mmol/L 23.8 22.7 21.6*   < > 20.5*   < > 20.2*   < > 22.5  22.5   < > 18.0*   BUN mg/dL 25* 25* 27*   < > 26*   < > 34*   < > 68*  68*   < > 84*   CREATININE mg/dL 1.11 1.05 1.17   < > 1.08   < > 1.53*   < > 4.45*  4.45*   < > 6.08*   GLUCOSE mg/dL 112* 120* 122*   < > 105*   < > 128*   < > 92  92   < > 91   CALCIUM mg/dL 8.4* 8.5* 8.8   < > 8.8   < > 9.0   < > 9.1  9.1   < > 9.4   WBC 10*3/mm3 13.23*  --   --   --  11.46*  --  11.39*  --  10.80  --  11.48*   HEMOGLOBIN g/dL 13.5  --   --   --  14.3  --  13.3  --  13.2  --  13.1   PLATELETS 10*3/mm3 317  --   --   --  377  --  364  --  344  --  341   ALT (SGPT) U/L 35  --   --   --  39  --   --   --  38  --  39   AST (SGOT) U/L 21  --   --   --  24  --   --   --  38  --  44*   PROCALCITONIN ng/mL  --   --   --   --   --   --   --   --   --   --  1.04*    < > = values in this interval not displayed.     No results found for: CKTOTAL, CKMB, CKMBINDEX, TROPONINI, TROPONINT  Results from last 7 days   Lab Units 06/18/19  0113   URINECX  No growth     Results from last 7 days   Lab Units 06/18/19  1512 06/18/19  0548 06/17/19  2232 06/17/19  2231   PROCALCITONIN ng/mL  --   --   --  1.04*   LACTATE mmol/L 0.9 0.8 0.8  --      Results from last 7 days   Lab Units 06/17/19  2231   INR  1.24*     Results from last 7 days   Lab Units 06/18/19  0113   NITRITE UA  Positive*   WBC UA /HPF 6-12*   BACTERIA UA /HPF None Seen   SQUAM EPITHEL UA /HPF 0-2   URINECX  No growth             Imaging: I personally visualized the images of chest scans/ x-rays performed within last 3 days and summarized below.    Assessment   Resuscitated PEA arrest x3  Shock - Cardiogenic versus other  Acute respiratory failure s/p  vent 6/21  A. fib RVR  Incarcerated left inguinal hernia status post open repair  BPH status post TURP  Acute renal failure  Acute obstructive uropathy/ hydronephrosis    RECOMMENDATIONS:  At this point it is not clearly apparent reason of the PEA arrest.  Patient had prolonged resuscitation more than an hour and Dr. Perdue discussed with the niece about probable need to hold CPR but she wanted to come in to see him.  ROSC obtained by patient on high-dose pressors and unresponsive with severe acidosis unable to be reversed in spite of significant mechanical ventilator support.  I have discussed with the consultants on the case including the surgeon as well as nephrology service who do not find an obvious etiology for the cardiac arrest.  KUB shows colonic distention which is nonspecific and electrolytes are within normal limits or unable to explain PEA/ Cardiac arrest  We will talk to the family on arrival to see how much they would like to keep continuing given the extremely poor prognosis from more than 60 minutes of resuscitation.  We will offer central line and further testing based on the discussion with the family.  Pulmonary embolism is a consideration if there is no acute issues noted on the chest x-ray as well as KUB.  Unfortunately doubt if any aggressive treatment for this as well would change outcome.  Code status to be determined     Addendum - After discussion with the family and her discussion with Dr. Perdue and they decided to withdraw life support as they understand the implication of long-term resuscitative efforts.  I have discussed with them about the events.  I told him that I do not have a good reason why he had a cardiac arrest and obviously the family have several questions regarding the cause of death and a want to consider autopsy.  I have respected their wishes and will continue with appropriate paperwork.    CC - 80 mins    Thank you for letting me participate in the care of this pleasant  patient.  I have discussed my findings and recommendations with family, nursing staff and consulting provider.     Gerald Man MD  6/21/2019  7:28 AM

## 2019-06-24 LAB
CYTO UR: NORMAL
LAB AP CASE REPORT: NORMAL
LAB AP CLINICAL INFORMATION: NORMAL
LAB AP PROVISIONAL DIAGNOSIS: NORMAL
PATH REPORT.FINAL DX SPEC: NORMAL
PATH REPORT.GROSS SPEC: NORMAL

## 2019-06-24 NOTE — PAYOR COMM NOTE
"DISCHARGED.  .        Hugo Lundberg (Dcsd. Male)     Date of Birth Social Security Number Address Home Phone MRN    1955  [id number] [id number] [phone] [id number]    Rastafarian Marital Status          None Single       Admission Date Admission Type Admitting Provider Attending Provider Department, Room/Bed    19 Urgent Roberto Patel MD  [facility], [address]/1    Discharge Date Discharge Disposition Discharge Destination        2019               Attending Provider:  (none)   Allergies:  No Known Allergies    Isolation:  None   Infection:  None   Code Status:  Prior    Ht:  182.9 cm (72\")   Wt:  79.1 kg (174 lb 4.8 oz)    Admission Cmt:  None   Principal Problem:  Acute renal failure (ARF) (CMS/[facility]) [N17.9]                 Active Insurance as of 2019     Primary Coverage     Payor Plan Insurance Group Employer/Plan Group    [employer] MEDICAID     Payor Plan Address Payor Plan Phone Number Payor Plan Fax Number Effective Dates    [address] [phone]  2019 - None Entered    [facility] [id number]       Subscriber Name Subscriber Birth Date Member ID       HUGO LUNDBERG 1955 [id number]                 Emergency Contacts      (Rel.) Home Phone Work Phone Mobile Phone    AL BUNCH (Sister) [phone] -- --    Karla Pratt (Other) -- -- [phone]            Discharge Summary     No notes of this type exist for this encounter.        "

## (undated) DEVICE — SPNG LAP 18X18IN LF STRL PK/5

## (undated) DEVICE — SPNG GZ WOVN 4X4IN 12PLY 10/BX STRL

## (undated) DEVICE — GLV SURG BIOGEL LTX PF 8 1/2

## (undated) DEVICE — DRN PENRS 5/8X18IN LTX

## (undated) DEVICE — APPL CHLORAPREP W/TINT 26ML ORNG

## (undated) DEVICE — LOU MINOR PROCEDURE: Brand: MEDLINE INDUSTRIES, INC.

## (undated) DEVICE — ANTIBACTERIAL UNDYED BRAIDED (POLYGLACTIN 910), SYNTHETIC ABSORBABLE SUTURE: Brand: COATED VICRYL

## (undated) DEVICE — DRSNG SURESITE WNDW 4X4.5

## (undated) DEVICE — SUT MNCRYL 4/0 PS2 18 IN

## (undated) DEVICE — SUT VIC 3/0 SH 27IN J416H

## (undated) DEVICE — SUT PDS 2/0 CT2 27IN Z333H

## (undated) DEVICE — 3M™ STERI-STRIP™ REINFORCED ADHESIVE SKIN CLOSURES, R1547, 1/2 IN X 4 IN (12 MM X 100 MM), 6 STRIPS/ENVELOPE: Brand: 3M™ STERI-STRIP™

## (undated) DEVICE — NDL HYPO PRECISIONGLIDE REG 25G 1 1/2

## (undated) DEVICE — SUT VIC 2/0 TIES 18IN J111T

## (undated) DEVICE — SUT SILK 2/0 SH CR5 18IN C0125

## (undated) DEVICE — ELECTRD BLD EDGE/INSUL1P 2.4X5.1MM STRL